# Patient Record
Sex: FEMALE | HISPANIC OR LATINO | Employment: OTHER | URBAN - METROPOLITAN AREA
[De-identification: names, ages, dates, MRNs, and addresses within clinical notes are randomized per-mention and may not be internally consistent; named-entity substitution may affect disease eponyms.]

---

## 2018-02-09 ENCOUNTER — OFFICE VISIT (OUTPATIENT)
Dept: RHEUMATOLOGY | Facility: CLINIC | Age: 68
End: 2018-02-09
Payer: MEDICARE

## 2018-02-09 ENCOUNTER — HOSPITAL ENCOUNTER (OUTPATIENT)
Dept: RADIOLOGY | Facility: HOSPITAL | Age: 68
Discharge: HOME | End: 2018-02-09
Attending: INTERNAL MEDICINE
Payer: MEDICARE

## 2018-02-09 VITALS
WEIGHT: 197 LBS | DIASTOLIC BLOOD PRESSURE: 79 MMHG | SYSTOLIC BLOOD PRESSURE: 142 MMHG | HEART RATE: 87 BPM | RESPIRATION RATE: 18 BRPM | BODY MASS INDEX: 38.68 KG/M2 | HEIGHT: 60 IN

## 2018-02-09 DIAGNOSIS — M25.50 POLYARTHRALGIA: ICD-10-CM

## 2018-02-09 DIAGNOSIS — M25.50 POLYARTHRALGIA: Primary | ICD-10-CM

## 2018-02-09 DIAGNOSIS — M15.9 PRIMARY OSTEOARTHRITIS INVOLVING MULTIPLE JOINTS: ICD-10-CM

## 2018-02-09 PROCEDURE — 99205 OFFICE O/P NEW HI 60 MIN: CPT | Mod: PBBFAC,25 | Performed by: INTERNAL MEDICINE

## 2018-02-09 PROCEDURE — 99204 OFFICE O/P NEW MOD 45 MIN: CPT | Mod: S$PBB,,, | Performed by: INTERNAL MEDICINE

## 2018-02-09 PROCEDURE — 1125F AMNT PAIN NOTED PAIN PRSNT: CPT | Mod: ,,, | Performed by: INTERNAL MEDICINE

## 2018-02-09 PROCEDURE — 77077 JOINT SURVEY SINGLE VIEW: CPT | Mod: 26,,, | Performed by: RADIOLOGY

## 2018-02-09 PROCEDURE — 99999 PR PBB SHADOW E&M-NEW PATIENT-LVL V: CPT | Mod: PBBFAC,,, | Performed by: INTERNAL MEDICINE

## 2018-02-09 PROCEDURE — 1159F MED LIST DOCD IN RCRD: CPT | Mod: ,,, | Performed by: INTERNAL MEDICINE

## 2018-02-09 PROCEDURE — 77077 JOINT SURVEY SINGLE VIEW: CPT | Mod: TC

## 2018-02-09 RX ORDER — METHIMAZOLE 5 MG/1
TABLET ORAL
COMMUNITY
Start: 2018-01-26

## 2018-02-09 RX ORDER — LISINOPRIL 5 MG/1
TABLET ORAL
COMMUNITY
Start: 2018-02-08

## 2018-02-09 RX ORDER — FLUTICASONE PROPIONATE AND SALMETEROL 50; 500 UG/1; UG/1
POWDER RESPIRATORY (INHALATION)
COMMUNITY
Start: 2018-02-08

## 2018-02-09 RX ORDER — WARFARIN 2.5 MG/1
TABLET ORAL
COMMUNITY
Start: 2018-01-29

## 2018-02-09 RX ORDER — BRIMONIDINE TARTRATE 1 MG/ML
SOLUTION/ DROPS OPHTHALMIC
COMMUNITY
Start: 2018-01-09

## 2018-02-09 RX ORDER — TRAMADOL HYDROCHLORIDE 50 MG/1
50 TABLET ORAL EVERY 8 HOURS PRN
Qty: 90 TABLET | Refills: 5 | Status: SHIPPED | OUTPATIENT
Start: 2018-02-09 | End: 2018-03-11

## 2018-02-09 RX ORDER — POTASSIUM CITRATE 10 MEQ/1
TABLET, EXTENDED RELEASE ORAL
COMMUNITY
Start: 2018-02-08

## 2018-02-09 RX ORDER — MONTELUKAST SODIUM 10 MG/1
TABLET ORAL
COMMUNITY
Start: 2018-02-08

## 2018-02-09 RX ORDER — LEVALBUTEROL TARTRATE 45 UG/1
AEROSOL, METERED ORAL
Refills: 2 | COMMUNITY
Start: 2018-01-04

## 2018-02-09 RX ORDER — BACITRACIN 500 [USP'U]/G
OINTMENT OPHTHALMIC
COMMUNITY
Start: 2017-12-08

## 2018-02-09 RX ORDER — OXYCODONE AND ACETAMINOPHEN 10; 325 MG/1; MG/1
TABLET ORAL
COMMUNITY
Start: 2017-11-29

## 2018-02-09 RX ORDER — DORZOLAMIDE HCL 20 MG/ML
SOLUTION/ DROPS OPHTHALMIC
COMMUNITY
Start: 2018-01-02

## 2018-02-09 RX ORDER — FLUTICASONE PROPIONATE 50 MCG
SPRAY, SUSPENSION (ML) NASAL
COMMUNITY
Start: 2018-02-08

## 2018-02-09 RX ORDER — ENOXAPARIN SODIUM 100 MG/ML
INJECTION SUBCUTANEOUS
COMMUNITY
Start: 2017-12-01

## 2018-02-09 RX ORDER — ONDANSETRON 4 MG/1
TABLET, ORALLY DISINTEGRATING ORAL
COMMUNITY
Start: 2017-11-29

## 2018-02-09 RX ORDER — LEVALBUTEROL INHALATION SOLUTION 0.63 MG/3ML
SOLUTION RESPIRATORY (INHALATION)
COMMUNITY
Start: 2018-01-09

## 2018-02-09 NOTE — PROGRESS NOTES
Subjective:       Patient ID: Cassie Srivastava is a 68 y.o. female.    Chief Complaint: No chief complaint on file.    HPI    67 yo F with PMH of Grave's disease (on methimazole) diagnosed in 2015,  Afib on coumadin, asthma, right knee replacement, pulmonary HTN, CHF, HTN, MELITON, left ankle fusion ( 2014), left rotator cuff tear since about age 63, hep C s/p treatment with interferon in 2006 here  for evaluation.  Reports she was recently diagnosed with pulmonary HTN in 2015 due to untreated MELITON.  Reports that she has pain in neck, low back, wrists, hands, left knee, and left ankle. Reports she has had pain for about 5 years.   Pain level is about 7/10 and aching. She takes tylenol with no significant improvement.  Reports swelling in wrists and ankles.  Walking makes pain worse.  Reports that she had pin placed in left  ankle for alignment but has had so much pain there it may be getting new surgery.  Reports she has stiffness in legs and arms in morning. She also has stiffness with prolonged sitting.  Reports on occasion itchy rash. Denies oral ulcers, hair loss, fevers, or pleurisy, Denies photosensitivity.        No past medical history on file.    Review of Systems   Constitutional: Negative for activity change, appetite change, chills, diaphoresis, fatigue, fever and unexpected weight change.   HENT: Negative for congestion, ear discharge, ear pain, facial swelling, mouth sores, sinus pressure, sneezing, sore throat, tinnitus and trouble swallowing.    Eyes: Negative for photophobia, pain, discharge, redness, itching and visual disturbance.   Respiratory: Negative for apnea, chest tightness, shortness of breath, wheezing and stridor.    Cardiovascular: Negative for leg swelling.   Gastrointestinal: Negative for abdominal distention, abdominal pain, anal bleeding, blood in stool, constipation, diarrhea and nausea.   Endocrine: Negative for cold intolerance and heat intolerance.   Genitourinary: Negative for  difficulty urinating and dysuria.   Musculoskeletal: Positive for arthralgias, back pain, gait problem, joint swelling and myalgias. Negative for neck pain and neck stiffness.   Skin: Negative for color change, pallor, rash and wound.   Neurological: Negative for dizziness, seizures, light-headedness and numbness.   Hematological: Negative for adenopathy. Does not bruise/bleed easily.   Psychiatric/Behavioral: Negative for sleep disturbance. The patient is not nervous/anxious.                Objective:   There were no vitals taken for this visit.     Physical Exam   Constitutional: She is oriented to person, place, and time.   HENT:   Head: Normocephalic and atraumatic.   Right Ear: External ear normal.   Left Ear: External ear normal.   Nose: Nose normal.   Mouth/Throat: Oropharynx is clear and moist. No oropharyngeal exudate.   Eyes: Conjunctivae and EOM are normal. Pupils are equal, round, and reactive to light. Right eye exhibits no discharge. Left eye exhibits no discharge. No scleral icterus.   Neck: Neck supple. No JVD present. No thyromegaly present.   Cardiovascular: Normal rate, regular rhythm, normal heart sounds and intact distal pulses.  Exam reveals no gallop and no friction rub.    No murmur heard.  Pulmonary/Chest: Effort normal and breath sounds normal. No respiratory distress. She has no wheezes. She has no rales. She exhibits no tenderness.   Abdominal: Soft. Bowel sounds are normal. She exhibits no distension and no mass. There is no tenderness. There is no rebound and no guarding.   Lymphadenopathy:     She has no cervical adenopathy.   Neurological: She is alert and oriented to person, place, and time. No cranial nerve deficit. Gait normal. Coordination normal.   Skin: Skin is dry. No rash noted. No erythema. No pallor.     Psychiatric: Affect and judgment normal.   Musculoskeletal: She exhibits edema and tenderness. She exhibits no deformity.                Assessment:     69 yo F with PMH of  Grave's disease (on methimazole) diagnosed in 2015,  Afib on coumadin, asthma, right knee replacement, pulmonary HTN, CHF, HTN, MELITON, left ankle fusion ( 2014), left rotator cuff tear since about age 63, hep C s/p treatment with interferon in 2006 here  for evaluation.  Reports she was recently diagnosed with pulmonary HTN in 2015 due to untreated MELITON.  Patient with very complicated medical history. I will work her up for systemic connective tissue disease to work her up further for her pulmonary HTN.  Regards her joint pain, on exam suspect, she has just OA but given her multiple surgeries will work her up for inflammatory arthritis as well.  No diagnosis found.        Plan:       Labs  xrays  rtc in 2 weeks  Encourage weight loss  *

## 2018-02-15 ENCOUNTER — TELEPHONE (OUTPATIENT)
Dept: RHEUMATOLOGY | Facility: CLINIC | Age: 68
End: 2018-02-15

## 2019-02-18 ENCOUNTER — TELEPHONE (OUTPATIENT)
Dept: OPHTHALMOLOGY | Facility: CLINIC | Age: 69
End: 2019-02-18

## 2019-02-18 NOTE — TELEPHONE ENCOUNTER
02/18/19  Katalina returned pt call and pt has been notified that she will be seen by Dr. Todd, OD of prism. stj 8:49a      ----- Message from Darvin Vizcarra sent at 2/15/2019  2:41 PM CST -----  Contact: Cassie  Ms. Srivastava says that her doctor advised her to schedule an appointment with Dr. Narvaez for strabismus. She can be reached at [phone].

## 2019-03-28 ENCOUNTER — INITIAL CONSULT (OUTPATIENT)
Dept: OPTOMETRY | Facility: CLINIC | Age: 69
End: 2019-03-28
Payer: MEDICARE

## 2019-03-28 DIAGNOSIS — H05.20 OCULAR PROPTOSIS: ICD-10-CM

## 2019-03-28 DIAGNOSIS — E05.00 GRAVES DISEASE: Primary | ICD-10-CM

## 2019-03-28 DIAGNOSIS — H50.112 EXOTROPIA OF LEFT EYE: ICD-10-CM

## 2019-03-28 PROCEDURE — 99499 NO LOS: ICD-10-PCS | Mod: S$PBB,,, | Performed by: OPTOMETRIST

## 2019-03-28 PROCEDURE — 99499 UNLISTED E&M SERVICE: CPT | Mod: S$PBB,,, | Performed by: OPTOMETRIST

## 2019-03-28 PROCEDURE — 99999 PR PBB SHADOW E&M-EST. PATIENT-LVL II: ICD-10-PCS | Mod: PBBFAC,,, | Performed by: OPTOMETRIST

## 2019-03-28 PROCEDURE — 99212 OFFICE O/P EST SF 10 MIN: CPT | Mod: PBBFAC | Performed by: OPTOMETRIST

## 2019-03-28 PROCEDURE — 99999 PR PBB SHADOW E&M-EST. PATIENT-LVL II: CPT | Mod: PBBFAC,,, | Performed by: OPTOMETRIST

## 2019-03-29 NOTE — PROGRESS NOTES
HPI     Concerns About Ocular Health      Additional comments: Referred for prism evaluation.  S/p orbital decompression surgery in 2018 (second procedure).  S/P strabismus surgery several years ago on left eye.  States procedure not successful.  Notes diplopia sometimes               Comments     Patient's age: 69 y.o.  female   Occupation: retired (nurse)  Approximate date of last eye examination: 01/2019  Name of last eye doctor seen: unknown  City/State: [facility]  Wears glasses? yes     If yes, wears  Full-time or part-time?  Full-time  Present glasses are: Bifocal, SV Distance, SV Reading?  bifocal  Approximate age of present glasses:  2 years    Got new glasses following last exam, or subsequently?:  no   Any problem with [facility] with glasses?  no  Wears CLs?:  no  Headaches?  Yes sinus  Eye pain/discomfort?  Dry eyes , itchy , watery eyes                                                                                   Flashes?  no  Floaters?  no  Diplopia/Double vision?  Yes left eye  Patient's Ocular History:         Any eye surgeries? Deep compression right 2018         Any eye injury?  no         Any treatment for eye disease?  glaucoma   Family history of eye disease?  Mother , sister and brother - glaucoma  Significant patient medical history:         1. Diabetes?  no   2. HBP?  Yes controlled with medication              3. Other (describe):  Grave disease    ! OTC eyedrops currently using:  Artifical tears , systane 3-4 daily    ! Prescription eye meds currently using:  bacitacin ad dorzolamide    ! Any history of allergy/adverse reaction to any eye meds used   previously?  no    ! Any history of allergy/adverse reaction to eyedrops used during prior   eye exam(s)? no    ! Any history of allergy/adverse reaction to Novacaine or similar meds?   no   ! Any history of allergy/adverse reaction to Epinephrine or similar meds?   no    ! Patient okay with use of anesthetic eyedrops to check eye pressure?     yes        ! Patient okay with use of eyedrops to dilate pupils today?  yes   !  Allergies/Medications/Medical History/Family History reviewed today?    yes      PD =     Desired reading distance =    Approx computer distance =                                                                     Last edited by Margarito Arzola, OD on 3/28/2019 11:20 AM. (History)            Assessment /Plan     For exam results, see Encounter Report.    Graves disease    Ocular proptosis    Exotropia of left eye                    History of Grave's Disease.  S/p orbital decompression surgery on right side.  S/p muscle surgery on left side, with residual large angle exotropia.  Report of transient diplopia.  Advised Ms. Srivastava that angle of deviation is greater than that which is corrected with prismatic correction,  Advised her to consider muscle surgery, and generate referral to Dr. Narvaez for consult,  If surgery done, suggest return thereafter for prismatic correction for residual deviation, if any .

## 2019-03-29 NOTE — PATIENT INSTRUCTIONS
History of Grave's Disease.  S/p orbital decompression surgery on right side.  S/p muscle surgery on left side, with residual large angle exotropia.  Report of transient diplopia.  Advised Ms. Srivastava that angle of deviation is greater than that which is corrected with prismatic correction,  Advised her to consider muscle surgery, and generate referral to Dr. Narvaez for consult,  If surgery done, suggest return thereafter for prismatic correction for residual deviation, if any .

## 2019-04-02 ENCOUNTER — TELEPHONE (OUTPATIENT)
Dept: OPHTHALMOLOGY | Facility: CLINIC | Age: 69
End: 2019-04-02

## 2019-04-02 NOTE — TELEPHONE ENCOUNTER
called patient infromed her that  referred her to see  but he only do muscle sx with patient under 50 infromed her she can do a consult with  at [facility] she stated ok she will contact him patient understand

## 2024-10-30 ENCOUNTER — TELEPHONE (OUTPATIENT)
Dept: INTERNAL MEDICINE | Facility: CLINIC | Age: 74
End: 2024-10-30
Payer: MEDICARE

## 2024-11-21 ENCOUNTER — OFFICE VISIT (OUTPATIENT)
Dept: INTERNAL MEDICINE | Facility: CLINIC | Age: 74
End: 2024-11-21
Payer: MEDICARE

## 2024-11-21 VITALS
BODY MASS INDEX: 38.54 KG/M2 | TEMPERATURE: 97 F | OXYGEN SATURATION: 99 % | WEIGHT: 204.13 LBS | HEIGHT: 61 IN | SYSTOLIC BLOOD PRESSURE: 136 MMHG | DIASTOLIC BLOOD PRESSURE: 74 MMHG | HEART RATE: 75 BPM

## 2024-11-21 DIAGNOSIS — Z78.0 ASYMPTOMATIC MENOPAUSAL STATE: ICD-10-CM

## 2024-11-21 DIAGNOSIS — I10 ESSENTIAL HYPERTENSION: ICD-10-CM

## 2024-11-21 DIAGNOSIS — G47.30 SLEEP APNEA WITH USE OF CONTINUOUS POSITIVE AIRWAY PRESSURE (CPAP): ICD-10-CM

## 2024-11-21 DIAGNOSIS — J43.1 PANLOBULAR EMPHYSEMA: ICD-10-CM

## 2024-11-21 DIAGNOSIS — H40.9 GLAUCOMA, UNSPECIFIED GLAUCOMA TYPE, UNSPECIFIED LATERALITY: ICD-10-CM

## 2024-11-21 DIAGNOSIS — E89.0 POST-SURGICAL HYPOTHYROIDISM: ICD-10-CM

## 2024-11-21 DIAGNOSIS — I48.20 CHRONIC ATRIAL FIBRILLATION: Primary | ICD-10-CM

## 2024-11-21 DIAGNOSIS — I27.20 PULMONARY HYPERTENSION: ICD-10-CM

## 2024-11-21 PROBLEM — E06.3 HASHIMOTO'S DISEASE: Status: ACTIVE | Noted: 2019-01-09

## 2024-11-21 PROBLEM — J44.1 COPD EXACERBATION: Status: ACTIVE | Noted: 2023-06-06

## 2024-11-21 PROCEDURE — 99204 OFFICE O/P NEW MOD 45 MIN: CPT | Mod: S$PBB,,, | Performed by: INTERNAL MEDICINE

## 2024-11-21 PROCEDURE — 99999 PR PBB SHADOW E&M-EST. PATIENT-LVL V: CPT | Mod: PBBFAC,,, | Performed by: INTERNAL MEDICINE

## 2024-11-21 PROCEDURE — G2211 COMPLEX E/M VISIT ADD ON: HCPCS | Mod: S$PBB,,, | Performed by: INTERNAL MEDICINE

## 2024-11-21 PROCEDURE — 99215 OFFICE O/P EST HI 40 MIN: CPT | Mod: PBBFAC,PO | Performed by: INTERNAL MEDICINE

## 2024-11-21 RX ORDER — DOFETILIDE 0.5 MG/1
125 CAPSULE ORAL EVERY 12 HOURS
COMMUNITY

## 2024-11-21 RX ORDER — ACETAMINOPHEN 325 MG/1
500 TABLET ORAL
COMMUNITY

## 2024-11-21 RX ORDER — CETIRIZINE HYDROCHLORIDE 10 MG/1
10 TABLET ORAL
COMMUNITY

## 2024-11-21 RX ORDER — ACETAMINOPHEN 500 MG
TABLET ORAL DAILY
COMMUNITY

## 2024-11-21 RX ORDER — LEVOTHYROXINE SODIUM 100 UG/1
100 TABLET ORAL
COMMUNITY
End: 2024-11-21 | Stop reason: ALTCHOICE

## 2024-11-21 RX ORDER — OMEPRAZOLE 10 MG/1
10 CAPSULE, DELAYED RELEASE ORAL
COMMUNITY

## 2024-11-21 RX ORDER — LEVOTHYROXINE SODIUM 88 UG/1
88 TABLET ORAL
COMMUNITY
Start: 2024-11-21

## 2024-11-21 RX ORDER — BIMATOPROST 0.1 MG/ML
1 SOLUTION/ DROPS OPHTHALMIC NIGHTLY
Qty: 7.5 ML | Refills: 1 | Status: SHIPPED | OUTPATIENT
Start: 2024-11-21

## 2024-11-21 RX ORDER — ATORVASTATIN CALCIUM 10 MG/1
10 TABLET, FILM COATED ORAL DAILY
COMMUNITY

## 2024-11-21 RX ORDER — FUROSEMIDE 20 MG/1
20 TABLET ORAL 2 TIMES DAILY
COMMUNITY

## 2024-11-21 RX ORDER — TEZEPELUMAB-EKKO 210 MG/1.9ML
INJECTION, SOLUTION SUBCUTANEOUS
COMMUNITY

## 2024-11-21 NOTE — PROGRESS NOTES
Subjective:       Patient ID: Cassie Srivastava is a 74 y.o. female.    Chief Complaint: Establish Care      HPI:  History of Present Illness    Patient presents today for follow-up and establishment of care after moving from [place].    She was diagnosed with atrial fibrillation approximately 10 years ago, managed with a anticoagulant (Xarelto 20 mg daily) and flecainide. She denies any major problems related to her atrial fibrillation.    She has a history of COPD, asthma, and pulmonary hypertension. She receives monthly injections for asthma control, which she reports are significantly helpful. She denies any major problems with her pulmonary conditions. She uses a CPAP machine for sleep apnea management, which has resolved previous episodes of waking up with air hunger.    She has a history of thyroidectomy due to three nodules and is currently taking Synthroid as thyroid replacement therapy.    She reports a history of depression, but is not currently taking any medication for it. She experiences occasional anxiety but denies any current depressive symptoms requiring treatment.    She reports an allergy to codeine, which causes nausea.    She has a history of occasional social smoking in nightclub settings and significant second-hand smoke exposure as a child. She acknowledges this exposure may have contributed to her asthma. She reports occasional alcohol use.    Her surgical history includes a partial hysterectomy, right knee replacement, cataract surgery, and bunion surgery.    She reports a family history of diabetes and hypertension in her sister, and hypertension and stroke in her brother.    She had a recent mammogram in [place], a colonoscopy in 2022 where polyps were removed, a pneumonia vaccination approximately 5-6 months ago, and a flu vaccine in September.    She takes Xarelto 20 mg daily and uses Lumigan 1% eye drops at night for glaucoma management.    She has glaucoma and states the need  "to see an eye doctor for management of this condition.         Review of Systems   Constitutional:  Negative for chills and fever.   HENT:  Negative for hearing loss.    Eyes:  Negative for photophobia and visual disturbance.   Respiratory:  Negative for cough, shortness of breath and wheezing.    Cardiovascular:  Negative for chest pain and palpitations.   Gastrointestinal:  Negative for blood in stool, constipation, nausea and vomiting.   Genitourinary:  Negative for dysuria and hematuria.   Musculoskeletal:  Negative for neck pain and neck stiffness.   Skin:  Negative for rash.   Neurological:  Negative for syncope, weakness, light-headedness and headaches.   Hematological:  Negative for adenopathy.   Psychiatric/Behavioral:  Negative for dysphoric mood. The patient is not nervous/anxious.        Objective:   /74 (BP Location: Left arm, Patient Position: Sitting)   Pulse 75   Temp 97.4 °F (36.3 °C) (Tympanic)   Ht 5' 1" (1.549 m)   Wt 92.6 kg (204 lb 2.3 oz)   SpO2 99%   BMI 38.57 kg/m²      Physical Exam  Vitals reviewed.   Constitutional:       Appearance: Normal appearance. She is well-developed. She is not ill-appearing.   HENT:      Head: Normocephalic and atraumatic.      Right Ear: Tympanic membrane, ear canal and external ear normal.      Left Ear: Tympanic membrane, ear canal and external ear normal.   Eyes:      Pupils: Pupils are equal, round, and reactive to light.   Neck:      Thyroid: No thyromegaly.   Cardiovascular:      Rate and Rhythm: Normal rate and regular rhythm.      Heart sounds: No murmur heard.     No friction rub. No gallop.   Pulmonary:      Effort: Pulmonary effort is normal.      Breath sounds: Normal breath sounds. No wheezing or rales.   Chest:      Chest wall: No tenderness.   Abdominal:      General: Abdomen is flat. Bowel sounds are normal. There is no distension.      Palpations: Abdomen is soft. There is no mass.      Tenderness: There is no abdominal tenderness. " There is no guarding or rebound.   Musculoskeletal:      Cervical back: Normal range of motion and neck supple.   Lymphadenopathy:      Cervical: No cervical adenopathy.   Skin:     General: Skin is warm and dry.      Findings: No rash.   Neurological:      General: No focal deficit present.      Mental Status: She is alert and oriented to person, place, and time.      Cranial Nerves: No cranial nerve deficit.      Deep Tendon Reflexes: Reflexes are normal and symmetric. Reflexes normal.   Psychiatric:         Behavior: Behavior normal.         Judgment: Judgment normal.         Lab work performed on November 8, 2024 reveals following:     White blood cell count 5.2, hemoglobin 13.1, hematocrit 39.2, platelets 191.  Sodium 141, potassium 4.7, chloride 104, bicarb 27, BUN 24, creatinine 0.879, glucose 84.  Calcium 9.3.  Protein 7.3.  Albumin 4.1.  Bilirubin 0.7.  Alk-phos 98.  ALT 15.  AST 21.    Iron 128, TIBC 335, saturation 38%,.    Cholesterol 171, triglycerides 56, HDL 71, LDL 89.  Hemoglobin A1c 5.3 TSH 7.27 with a free T4 of 1.16.  Vitamin B12 1151.  Vitamin-D 56.    Assessment:       1. Chronic atrial fibrillation    2. Panlobular emphysema    3. Sleep apnea with use of continuous positive airway pressure (CPAP)    4. Essential hypertension    5. Pulmonary hypertension    6. Post-surgical hypothyroidism    7. Asymptomatic menopausal state    8. Glaucoma, unspecified glaucoma type, unspecified laterality        Plan:   Sleep apnea with use of continuous positive airway pressure (CPAP)  Patient is compliant with use of cpap, using it the majority of nights.  Benefit from the use of the device is noted.    Cassie was seen today for establish care.    Diagnoses and all orders for this visit:    Chronic atrial fibrillation  Comments:  rate controlled, on eliquis.  refer to cardiology  Orders:  -     Ambulatory referral/consult to Cardiology; Future  -     rivaroxaban (XARELTO) 20 mg Tab; Take 1 tablet (20 mg  total) by mouth daily with dinner or evening meal.    Panlobular emphysema  Comments:  stable, refer to pulmonary for establishing care  Orders:  -     Ambulatory referral/consult to Pulmonology; Future    Sleep apnea with use of continuous positive airway pressure (CPAP)  Comments:  Stable on cpap, Continue and follow up with pulmomary/sleep    Essential hypertension  Comments:  No change, stable.    Pulmonary hypertension  Comments:  Continue meds as prescribed. work on weight loss, follow up with pulmonary/cards    Post-surgical hypothyroidism  -     TSH; Future    Asymptomatic menopausal state  -     DXA Bone Density Axial Skeleton 1 or more sites; Future    Glaucoma, unspecified glaucoma type, unspecified laterality  -     bimatoprost (LUMIGAN) 0.01 % Drop; Place 1 drop into both eyes every evening.  -     Ambulatory referral/consult to Ophthalmology; Future      Assessment & Plan    ATRIAL FIBRILLATION:  - Reviewed patient's history of atrial fibrillation.  - Evaluated current medication regimen, including anticoagulation therapy.  - Continued Xarelto 20 mg daily.  - Referred to Cardiology.    CHRONIC OBSTRUCTIVE PULMONARY DISEASE (COPD):  - Reviewed patient's history of COPD.  - Referred to Pulmonology.    HYPERTENSION:  - Reviewed patient's history of hypertension.  - Assessed cardiovascular risk factors, including family history of hypertension and stroke.    SLEEP APNEA:  - Reviewed patient's history of sleep apnea.    THYROID DISORDERS:  - Reviewed patient's history of thyroidectomy.  - Noted recent thyroid medication adjustment from 75 to 88 mcg.  - Thyroid function test ordered in 3 months to reassess after medication adjustment.    MENTAL HEALTH:  - Considered patient's history of depression and current anxiety symptoms.    GLAUCOMA:  - Started Lumigan 0.1% eye drops, 1 drop in both eyes at night.  - Referred to Ophthalmology.    GENERAL HEALTH MANAGEMENT:  - Determined need for ongoing specialist  care, including cardiology, pulmonology, and ophthalmology.  - Bone density scan ordered to be completed within the next 1-2 months.    FOLLOW-UP:  - Follow up after the holidays.         Follow up in about 8 weeks (around 1/16/2025).

## 2024-12-02 ENCOUNTER — TELEPHONE (OUTPATIENT)
Dept: PULMONOLOGY | Facility: CLINIC | Age: 74
End: 2024-12-02
Payer: MEDICARE

## 2024-12-02 NOTE — TELEPHONE ENCOUNTER
----- Message from Sharon sent at 12/2/2024  1:17 PM CST -----  Contact: self  [phone]  Type:  Needs Medical Advice    Who Called: Cassie  Symptoms (please be specific):  How long has patient had these symptoms:   Pharmacy name and phone #:   Would the patient rather a call back or a response via [facility]?call back   Best Call Back Number:  [phone]  Additional Information: patient called in this afternoon to give the fax number so you can fax over information on what records are needed.  Fax number for Dr. Kojo Farmer  [phone] Please call back  [phone]. Thanks tpw or ask to speak with Jenna Feliciano  [phone]

## 2024-12-02 NOTE — TELEPHONE ENCOUNTER
Called pt to notify we'll need a signed release of information form prior to requesting records from previous Dr's office, no answer, not able to leave message

## 2024-12-04 DIAGNOSIS — I10 ESSENTIAL HYPERTENSION: ICD-10-CM

## 2024-12-09 ENCOUNTER — TELEPHONE (OUTPATIENT)
Dept: PULMONOLOGY | Facility: CLINIC | Age: 74
End: 2024-12-09
Payer: MEDICARE

## 2024-12-09 NOTE — TELEPHONE ENCOUNTER
Attempted to return call to verify DAVID to get history records, incorrect phone number listed.. Unable to reach patient at this time LVM/.

## 2024-12-09 NOTE — TELEPHONE ENCOUNTER
----- Message from Lylette sent at 12/6/2024  4:56 PM CST -----  Regarding: Pt advice  Contact: [phone]  Pt calling to confirm that record release form was sent to Dr. Robins. Pt appt on 12/12. Please call [phone], fax [phone] Dr. Jenna Robins

## 2024-12-10 ENCOUNTER — TELEPHONE (OUTPATIENT)
Dept: PULMONOLOGY | Facility: CLINIC | Age: 74
End: 2024-12-10
Payer: MEDICARE

## 2024-12-10 NOTE — TELEPHONE ENCOUNTER
Good afternoon, we cannot just fax a request for medical records you must come in and sign a release of information before I can do that or you can call them and have them fax the records to Dr. Cruz at [phone]

## 2024-12-10 NOTE — TELEPHONE ENCOUNTER
----- Message from Sharon sent at 12/10/2024  3:26 PM CST -----  Contact: self  [phone]  Type:  Needs Medical Advice    Who Called: Cassie  Symptoms (please be specific): records   How long has patient had these symptoms:   Pharmacy name and phone #:   Would the patient rather a call back or a response via [facility]?call back   Best Call Back Number:  [phone]  Additional Information: patient called in this afternoon leaving a fax number to request her records from her previous provider in [place]. Dr. Jenna Feliciano NP   [phone]  fax [phone]

## 2024-12-11 ENCOUNTER — TELEPHONE (OUTPATIENT)
Dept: PULMONOLOGY | Facility: CLINIC | Age: 74
End: 2024-12-11
Payer: MEDICARE

## 2024-12-11 NOTE — TELEPHONE ENCOUNTER
Spoke with patient to explain the HIPAA policy requarding release of information. Pt voiced understanding.

## 2024-12-11 NOTE — PROGRESS NOTES
"Chief complaint:  Chief Complaint   Patient presents with    Emphysema        History of present illness -  HPI   Ms Srivastava comes to clinic to establish care.  She carries a history significant for severe persistent asthma on biologic therapy (Tezepelumab), atrial fibrillation and heart failure with preserved ejection fraction.  She was exposed to viral illness earlier this month that resulted in a asthma exacerbation, she presented to an urgent care on December 4th where she was found to have severely elevated blood pressure and was denied care, she was told to go to the emergency department were she was treated with p.o. steroids as well as antibiotics, and sent home, because her symptoms were not improving enough she decided to go back to the ER the next day where she was admitted for a asthma as well as a CHF exacerbation, she was started on frequent nebulization therapy, IV steroids as well as antibiotics for potential bacterial infection.  She was discharged 3 days later after her symptoms improved a bit.  Per chart review she tested negative for COVID, RSV and influenza, but her grandson who lives with her was also sick.  Her main lingering symptom is cough and persistent wheezing.    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    CMP was essentially " unremarkable    Spirometry -  Not available    Imaging -   A report from an x-ray performed at [facility] on 2024 showed mild cardiac silhouette enlargement.    Assessment & Plan    Chronic atrial fibrillation    Panlobular emphysema  Comments:  stable, refer to pulmonary for establishing care  Orders:  -     Ambulatory referral/consult to Pulmonology  -     IgE; Future; Expected date: 2024  -     CBC Auto Differential; Future; Expected date: 2024  -     Fraction of  Nitric Oxide; Future; Expected date: 2024  -     Ambulatory referral/consult to Pulmonary Disease Management w/ Respiratory Therapist; Future; Expected date: 2024  -     Complete PFT with bronchodilator; Future; Expected date: 2024    Moderate persistent asthma with acute exacerbation  -     IgE; Future; Expected date: 2024    MELITON (obstructive sleep apnea)    Pulmonary hypertension    Other orders  -     predniSONE (DELTASONE) 20 MG tablet; Take 2 tablets (40 mg total) by mouth once daily. for 12 days  Dispense: 24 tablet; Refill: 0  -     tezepelumab-ekko 210 mg/1.91 mL (110 mg/mL) Syrg; Inject 1.91 mLs (210 mg total) into the skin every 30 days.  Dispense: 1 mL; Refill: 5    She has a classic asthma exacerbation from a viral illness, we will prescribe the patient 12 more days of 40 mg of prednisone as her symptoms are lingering, she is to continue her rescue inhaler and maintenance inhaler as prescribed.  She is currently taking tezepelumab for her difficult to control asthma, her next dose is on , we will arrange for this to happen in our system.    Rest of the workup to be done during next visit with complete PFT, IgE, CBC, allergy testing in the future.    Continue same fluticasone spray for allergic rhinitis.    She has a history of MELITON of uncertain severity, when we get access to her outside records we will migrate her care to us.    RTC in 4 weeks    Shon Cruz    12/12/2024

## 2024-12-11 NOTE — TELEPHONE ENCOUNTER
----- Message from Sharon sent at 12/11/2024  1:36 PM CST -----  Contact: self  [phone]  Type:  Needs Medical Advice    Who Called: Cassie  Symptoms (please be specific):  How long has patient had these symptoms:   Pharmacy name and phone #:    Would the patient rather a call back or a response via [facility]?call back   Best Call Back Number:  [phone]  Additional Information: patient called in this afternoon wanting to know have you received the medical records from her previous provider. Please call back to verify she has a appointment scheduled for tomorrow. Thanks azael

## 2024-12-12 ENCOUNTER — APPOINTMENT (OUTPATIENT)
Dept: RADIOLOGY | Facility: HOSPITAL | Age: 74
End: 2024-12-12
Attending: INTERNAL MEDICINE
Payer: MEDICARE

## 2024-12-12 ENCOUNTER — OFFICE VISIT (OUTPATIENT)
Dept: PULMONOLOGY | Facility: CLINIC | Age: 74
End: 2024-12-12
Payer: MEDICARE

## 2024-12-12 VITALS
SYSTOLIC BLOOD PRESSURE: 138 MMHG | HEIGHT: 61 IN | HEART RATE: 61 BPM | BODY MASS INDEX: 37.88 KG/M2 | WEIGHT: 200.63 LBS | OXYGEN SATURATION: 98 % | RESPIRATION RATE: 13 BRPM | DIASTOLIC BLOOD PRESSURE: 70 MMHG

## 2024-12-12 DIAGNOSIS — J45.41 MODERATE PERSISTENT ASTHMA WITH ACUTE EXACERBATION: ICD-10-CM

## 2024-12-12 DIAGNOSIS — I48.20 CHRONIC ATRIAL FIBRILLATION: Primary | ICD-10-CM

## 2024-12-12 DIAGNOSIS — Z78.0 ASYMPTOMATIC MENOPAUSAL STATE: ICD-10-CM

## 2024-12-12 DIAGNOSIS — J43.1 PANLOBULAR EMPHYSEMA: ICD-10-CM

## 2024-12-12 DIAGNOSIS — G47.33 OSA (OBSTRUCTIVE SLEEP APNEA): ICD-10-CM

## 2024-12-12 DIAGNOSIS — J30.2 SEASONAL ALLERGIC RHINITIS, UNSPECIFIED TRIGGER: ICD-10-CM

## 2024-12-12 DIAGNOSIS — I27.20 PULMONARY HYPERTENSION: ICD-10-CM

## 2024-12-12 PROCEDURE — 99215 OFFICE O/P EST HI 40 MIN: CPT | Mod: PBBFAC,25 | Performed by: STUDENT IN AN ORGANIZED HEALTH CARE EDUCATION/TRAINING PROGRAM

## 2024-12-12 PROCEDURE — 77080 DXA BONE DENSITY AXIAL: CPT | Mod: 26,,, | Performed by: RADIOLOGY

## 2024-12-12 PROCEDURE — 77080 DXA BONE DENSITY AXIAL: CPT | Mod: TC

## 2024-12-12 PROCEDURE — 99999 PR PBB SHADOW E&M-EST. PATIENT-LVL V: CPT | Mod: PBBFAC,,, | Performed by: STUDENT IN AN ORGANIZED HEALTH CARE EDUCATION/TRAINING PROGRAM

## 2024-12-12 RX ORDER — MONTELUKAST SODIUM 10 MG/1
1 TABLET ORAL NIGHTLY
COMMUNITY

## 2024-12-12 RX ORDER — PREDNISONE 20 MG/1
40 TABLET ORAL DAILY
Qty: 24 TABLET | Refills: 0 | Status: SHIPPED | OUTPATIENT
Start: 2024-12-12 | End: 2024-12-24

## 2024-12-12 RX ORDER — DOCUSATE SODIUM 100 MG/1
100 CAPSULE, LIQUID FILLED ORAL
COMMUNITY
Start: 2024-12-09

## 2024-12-12 RX ORDER — AMLODIPINE BESYLATE 5 MG/1
5 TABLET ORAL
COMMUNITY
Start: 2024-02-01

## 2024-12-12 RX ORDER — LANOLIN ALCOHOL/MO/W.PET/CERES
1 CREAM (GRAM) TOPICAL EVERY MORNING
COMMUNITY

## 2024-12-12 RX ORDER — ZINC GLUCONATE 50 MG
1 TABLET ORAL EVERY MORNING
COMMUNITY

## 2024-12-12 RX ORDER — BENZONATATE 100 MG/1
200 CAPSULE ORAL
COMMUNITY
Start: 2024-12-09

## 2024-12-16 ENCOUNTER — TELEPHONE (OUTPATIENT)
Dept: INTERNAL MEDICINE | Facility: CLINIC | Age: 74
End: 2024-12-16
Payer: MEDICARE

## 2024-12-16 NOTE — TELEPHONE ENCOUNTER
----- Message from Lukasz sent at 12/16/2024  8:52 AM CST -----  Type:  Needs Medical Advice    Who Called: PT  Symptoms (please be specific): Symptom: High Blood Pressure - Caller Reports  Outcome: Schedule a same-day appointment or talk to a nurse or provider within 1 hour.  Reason: Caller denied all higher acuity questions  How long has patient had these symptoms:  5 days - since starting prednisone  Pharmacy name and phone #:  -  Would the patient rather a call back or a response via [facility]? CALL  Best Call Back Number:  [phone]  Additional Information: Pt states she would like a call back from office to see what she should do. Pt has an appt scheduled for tomorrow at 10. Thank you

## 2024-12-17 ENCOUNTER — OFFICE VISIT (OUTPATIENT)
Dept: INTERNAL MEDICINE | Facility: CLINIC | Age: 74
End: 2024-12-17
Payer: MEDICARE

## 2024-12-17 VITALS
BODY MASS INDEX: 38.49 KG/M2 | DIASTOLIC BLOOD PRESSURE: 66 MMHG | WEIGHT: 203.69 LBS | OXYGEN SATURATION: 97 % | TEMPERATURE: 98 F | SYSTOLIC BLOOD PRESSURE: 158 MMHG | HEART RATE: 62 BPM

## 2024-12-17 DIAGNOSIS — Z09 HOSPITAL DISCHARGE FOLLOW-UP: Primary | ICD-10-CM

## 2024-12-17 DIAGNOSIS — I50.9 CONGESTIVE HEART FAILURE, UNSPECIFIED HF CHRONICITY, UNSPECIFIED HEART FAILURE TYPE: ICD-10-CM

## 2024-12-17 DIAGNOSIS — I48.20 CHRONIC ATRIAL FIBRILLATION: ICD-10-CM

## 2024-12-17 DIAGNOSIS — J44.1 COPD WITH EXACERBATION: ICD-10-CM

## 2024-12-17 DIAGNOSIS — E66.01 SEVERE OBESITY (BMI 35.0-39.9) WITH COMORBIDITY: ICD-10-CM

## 2024-12-17 DIAGNOSIS — I10 ESSENTIAL HYPERTENSION: ICD-10-CM

## 2024-12-17 PROCEDURE — 99215 OFFICE O/P EST HI 40 MIN: CPT | Mod: PBBFAC,PO | Performed by: NURSE PRACTITIONER

## 2024-12-17 PROCEDURE — 99495 TRANSJ CARE MGMT MOD F2F 14D: CPT | Mod: S$PBB,,, | Performed by: NURSE PRACTITIONER

## 2024-12-17 PROCEDURE — 99999 PR PBB SHADOW E&M-EST. PATIENT-LVL V: CPT | Mod: PBBFAC,,, | Performed by: NURSE PRACTITIONER

## 2024-12-17 RX ORDER — LISINOPRIL 20 MG/1
20 TABLET ORAL 2 TIMES DAILY
COMMUNITY

## 2024-12-17 RX ORDER — AMLODIPINE BESYLATE 5 MG/1
5 TABLET ORAL DAILY
Qty: 90 TABLET | Refills: 0 | Status: SHIPPED | OUTPATIENT
Start: 2024-12-17 | End: 2025-03-17

## 2024-12-17 NOTE — PROGRESS NOTES
Subjective:       Patient ID: Cassie Srivastava is a 74 y.o. female.    CHIEF COMPLAINT:  - Ms. Srivastava presents for follow-up after a recent hospitalization for congestive heart failure (CHF) exacerbation, with concerns about persistent elevated blood pressure and breathing difficulties.    HPI:  Ms. Srivastava recently relocated to [place] from [place] in November and was hospitalized for CHF exacerbation. She has a history of atrial fibrillation (A-fib) and CHF. During hospitalization, she received Lasix and IV Solu-Medrol, followed by a 3-day course of oral prednisone. A pulmonologist consultation on the 12th recommended continuing steroids for a few additional days.    Post-hospitalization, the patient reports persistently elevated blood pressure, particularly in the mornings, with readings as high as 180/70, later decreasing to around 160. This marks a significant change from her previously well-controlled blood pressure, typically in the 120s to 130s. Ms. Srivastava is concerned about these elevated readings and their potential relationship to her medications or CHF. She does admit she was taking 2.5 mg of her norvasc     Ms. Srivastava had the flu concurrently with her recent health complications. She reports fatigue and occasional dizziness, which she attributes to her high blood pressure and breathing difficulties. Ms. Srivastava notes that her recovery is taking longer than anticipated.    Regarding respiratory symptoms, the patient is using an inhaler and nebulizer. She takes Advair in the mornings and evenings. She reports a significant cough that has since improved but still requires expectoration. Ms. Srivastava performs breathing exercises, including using an incentive spirometer 3 times daily, and engages in sitting exercises for physical activity.         BP (!) 158/66   Pulse 62   Temp 98.1 °F (36.7 °C) (Tympanic)   Wt 92.4 kg (203 lb 11.3 oz)   SpO2 97%   BMI 38.49 kg/m²     Review of  Systems   Constitutional:  Negative for activity change, appetite change, chills, diaphoresis, fatigue, fever and unexpected weight change.   HENT: Negative.     Respiratory:  Positive for cough and chest tightness. Negative for shortness of breath.    Cardiovascular:  Negative for chest pain, palpitations and leg swelling.   Gastrointestinal: Negative.    Genitourinary: Negative.    Musculoskeletal: Negative.    Skin:  Negative for color change, pallor, rash and wound.   Allergic/Immunologic: Negative for immunocompromised state.   Neurological: Negative.  Negative for dizziness and facial asymmetry.   Hematological:  Negative for adenopathy. Does not bruise/bleed easily.   Psychiatric/Behavioral:  Negative for agitation, behavioral problems and confusion.        Objective:      Physical Exam  Vitals and nursing note reviewed.   Constitutional:       General: She is not in acute distress.     Appearance: Normal appearance. She is well-developed. She is not diaphoretic.   HENT:      Head: Normocephalic and atraumatic.   Cardiovascular:      Rate and Rhythm: Normal rate and regular rhythm.      Heart sounds: Normal heart sounds. No murmur heard.  Pulmonary:      Effort: Pulmonary effort is normal. No respiratory distress.      Breath sounds: Wheezing present.   Musculoskeletal:         General: Normal range of motion.   Skin:     General: Skin is warm and dry.      Findings: No rash.   Neurological:      Mental Status: She is alert.   Psychiatric:         Mood and Affect: Mood normal.         Behavior: Behavior normal. Behavior is cooperative.         Thought Content: Thought content normal.         Judgment: Judgment normal.         Assessment and Plan        Cassie was seen today for hospital follow up.    Diagnoses and all orders for this visit:    Hospital discharge follow-up    Chronic atrial fibrillation    COPD with exacerbation    Essential hypertension  -     amLODIPine (NORVASC) 5 MG tablet; Take 1 tablet  (5 mg total) by mouth once daily.    Congestive heart failure, unspecified HF chronicity, unspecified heart failure type    Severe obesity (BMI 35.0-39.9) with comorbidity      There are no Patient Instructions on file for this visit.      1. Hospital discharge follow-up    2. Chronic atrial fibrillation    3. COPD with exacerbation    4. Essential hypertension    5. Congestive heart failure, unspecified HF chronicity, unspecified heart failure type    6. Severe obesity (BMI 35.0-39.9) with comorbidity        Assessment & Plan    HEART FAILURE AND ATRIAL FIBRILLATION:  - Continued management of concurrent cardiac (A-fib, CHF) issues following recent hospital discharge for CHF exacerbation complicated by flu.  - Continued current Lasix regimen to manage fluid status.  - Monitored potassium levels due to Lasix use.  - Awaiting cardiology follow-up for further management of cardiac issues.  - Discussed importance of fluid restriction and low sodium diet for CHF management.  - Recommend maintaining low sodium diet.    CHRONIC OBSTRUCTIVE PULMONARY DISEASE:  - Continued management of pulmonary (COPD) issues following recent hospital discharge.  - Educated on use of incentive spirometer and breathing exercises for lung function.  - Ms. Srivastava to perform deep breathing exercises, walk around house, and use incentive spirometer 3 times daily.  - Started OTC Mucinex (guaifenesin only, no DM) as needed for mucus.  - Continued Advair inhaler in morning and evening.    HYPERTENSION:  - Noted elevated blood pressure likely due to recent illness and medications.  - Increased Amlodipine to 5mg daily; trial for a few days to assess blood pressure response.  - Continued Lisinopril 20mg twice daily.  - Refilled Amlodipine 5mg.  - Contact the office if blood pressure remains elevated after trial of increased Amlodipine.    FOLLOW-UP:  - Follow up with Dr. Stafford (cardiology) on January 22nd as scheduled  - keep follow up with   Nina in [place] as scheduled and PRN         This note was generated with the assistance of ambient listening technology. Verbal consent was obtained by the patient and accompanying visitor(s) for the recording of patient appointment to facilitate this note. I attest to having reviewed and edited the generated note for accuracy, though some syntax or spelling errors may persist. Please contact the author of this note for any clarification.

## 2024-12-17 NOTE — PROGRESS NOTES
Subjective:       Patient ID: Cassie Srivastava is a 74 y.o. female.    Chief Complaint: Hospital Follow Up    HPI    BP (!) 158/66   Pulse 62   Temp 98.1 °F (36.7 °C) (Tympanic)   Wt 92.4 kg (203 lb 11.3 oz)   SpO2 97%   BMI 38.49 kg/m²     Review of Systems    Objective:      Physical Exam    Assessment:       1. Hospital discharge follow-up    2. Chronic atrial fibrillation    3. COPD with exacerbation    4. Essential hypertension    5. Congestive heart failure, unspecified HF chronicity, unspecified heart failure type    6. BMI 38.0-38.9,adult        Plan:       Cassie was seen today for hospital follow up.    Diagnoses and all orders for this visit:    Hospital discharge follow-up    Chronic atrial fibrillation    COPD with exacerbation    Essential hypertension  -     amLODIPine (NORVASC) 5 MG tablet; Take 1 tablet (5 mg total) by mouth once daily.    Congestive heart failure, unspecified HF chronicity, unspecified heart failure type    BMI 38.0-38.9,adult      There are no Patient Instructions on file for this visit.

## 2024-12-26 ENCOUNTER — TELEPHONE (OUTPATIENT)
Dept: CARDIOLOGY | Facility: CLINIC | Age: 74
End: 2024-12-26
Payer: MEDICARE

## 2024-12-26 NOTE — TELEPHONE ENCOUNTER
----- Message from Allison sent at 12/26/2024  1:16 PM CST -----  Contact: self  Patient is requesting a call back regarding appt - asking for a sooner appt. Please call back at [phone]

## 2024-12-26 NOTE — TELEPHONE ENCOUNTER
Patient contacted. Patient requests a sooner appointment with Dr. Carlos Stafford. Appointment has been rescheduled to 1/3/25. Patient has been made aware of all appointment information. Patient verbalizes understanding.

## 2025-01-02 DIAGNOSIS — I48.91 ATRIAL FIBRILLATION, UNSPECIFIED TYPE: Primary | ICD-10-CM

## 2025-01-09 ENCOUNTER — OFFICE VISIT (OUTPATIENT)
Dept: INTERNAL MEDICINE | Facility: CLINIC | Age: 75
End: 2025-01-09
Payer: MEDICARE

## 2025-01-09 ENCOUNTER — HOSPITAL ENCOUNTER (OUTPATIENT)
Dept: RADIOLOGY | Facility: HOSPITAL | Age: 75
Discharge: HOME | End: 2025-01-09
Attending: NURSE PRACTITIONER
Payer: MEDICARE

## 2025-01-09 VITALS
SYSTOLIC BLOOD PRESSURE: 150 MMHG | HEIGHT: 61 IN | DIASTOLIC BLOOD PRESSURE: 64 MMHG | BODY MASS INDEX: 37.88 KG/M2 | WEIGHT: 200.63 LBS | OXYGEN SATURATION: 98 % | HEART RATE: 80 BPM | TEMPERATURE: 99 F

## 2025-01-09 DIAGNOSIS — R10.32 LEFT LOWER QUADRANT PAIN: ICD-10-CM

## 2025-01-09 DIAGNOSIS — R10.32 LEFT LOWER QUADRANT PAIN: Primary | ICD-10-CM

## 2025-01-09 DIAGNOSIS — I10 ESSENTIAL HYPERTENSION: ICD-10-CM

## 2025-01-09 PROCEDURE — 99999 PR PBB SHADOW E&M-EST. PATIENT-LVL V: CPT | Mod: PBBFAC,,, | Performed by: NURSE PRACTITIONER

## 2025-01-09 PROCEDURE — 99215 OFFICE O/P EST HI 40 MIN: CPT | Mod: PBBFAC,25,PO | Performed by: NURSE PRACTITIONER

## 2025-01-09 PROCEDURE — 74018 RADEX ABDOMEN 1 VIEW: CPT | Mod: TC,FY,PO

## 2025-01-09 PROCEDURE — 99214 OFFICE O/P EST MOD 30 MIN: CPT | Mod: S$PBB,,, | Performed by: NURSE PRACTITIONER

## 2025-01-09 PROCEDURE — 74018 RADEX ABDOMEN 1 VIEW: CPT | Mod: 26,,, | Performed by: RADIOLOGY

## 2025-01-09 RX ORDER — AMOXICILLIN AND CLAVULANATE POTASSIUM 875; 125 MG/1; MG/1
1 TABLET, FILM COATED ORAL 2 TIMES DAILY
Qty: 20 TABLET | Refills: 0 | Status: SHIPPED | OUTPATIENT
Start: 2025-01-09

## 2025-01-09 NOTE — PROGRESS NOTES
"Subjective:       Patient ID: Cassie Srivastava is a 74 y.o. female.    CHIEF COMPLAINT:  - Ms. Srivastava presents with left-sided abdominal pain     HPI:  Ms. Srivastava reports left-sided abdominal pain localized to the left lower quadrant, exacerbated by coughing. Pain onset occurred after consuming spicy chicken wings at approximately 6:45 PM on Tuesday. The pain has persisted and did not improve after a bowel movement this morning. Ms. Srivastava has a history of constipation and is currently taking a stool softener, which she reports is ineffective. She has daily bowel movements, sometimes requiring effort to pass stool. Prior to taking the stool softener, the patient had frequent constipation. Ms. Srivastava has a history of esophagitis, though it is unclear if the current symptoms are related. Ms. Srivastava is uncertain about the relationship between the chicken wing consumption and the pain onset. She delayed seeking medical attention, hoping the symptoms would resolve spontaneously. Ms. Srivastava denies nausea, vomiting, and diarrhea.        BP (!) 150/64 (Patient Position: Sitting)   Pulse 80   Temp 98.7 °F (37.1 °C) (Tympanic)   Ht 5' 1" (1.549 m)   Wt 91 kg (200 lb 9.9 oz)   SpO2 98%   BMI 37.91 kg/m²     Review of Systems   Constitutional:  Negative for activity change, appetite change, chills, diaphoresis, fatigue, fever and unexpected weight change.   HENT: Negative.     Respiratory:  Negative for cough and shortness of breath.    Cardiovascular:  Negative for chest pain, palpitations and leg swelling.   Gastrointestinal:  Positive for abdominal pain and constipation. Negative for diarrhea, nausea and vomiting.   Genitourinary: Negative.    Musculoskeletal:  Positive for arthralgias and myalgias.   Skin:  Negative for color change, pallor, rash and wound.   Allergic/Immunologic: Negative for immunocompromised state.   Neurological: Negative.  Negative for dizziness and facial asymmetry. "   Hematological:  Negative for adenopathy. Does not bruise/bleed easily.   Psychiatric/Behavioral:  Negative for agitation, behavioral problems and confusion.        Objective:      Physical Exam  Vitals and nursing note reviewed.   Constitutional:       General: She is not in acute distress.     Appearance: She is well-developed. She is not diaphoretic.   HENT:      Head: Normocephalic and atraumatic.      Mouth/Throat:      Pharynx: No oropharyngeal exudate.   Eyes:      General:         Right eye: No discharge.         Left eye: No discharge.      Conjunctiva/sclera: Conjunctivae normal.   Cardiovascular:      Rate and Rhythm: Normal rate and regular rhythm.      Heart sounds: Normal heart sounds. No murmur heard.  Pulmonary:      Effort: Pulmonary effort is normal. No respiratory distress.      Breath sounds: Normal breath sounds. No wheezing.   Abdominal:      General: There is no distension.      Palpations: Abdomen is soft.      Tenderness: There is abdominal tenderness in the left lower quadrant.   Musculoskeletal:         General: No tenderness. Normal range of motion.   Skin:     General: Skin is warm and dry.      Findings: No erythema or rash.   Neurological:      Mental Status: She is alert and oriented to person, place, and time.   Psychiatric:         Behavior: Behavior normal.         Thought Content: Thought content normal.         Judgment: Judgment normal.         Assessment and Plan        Cassie was seen today for abdominal pain.    Diagnoses and all orders for this visit:    Left lower quadrant pain  -     X-Ray Abdomen AP 1 View; Future    Essential hypertension      There are no Patient Instructions on file for this visit.      1. Left lower quadrant pain    2. Essential hypertension        Assessment & Plan    ABDOMINAL PAIN:  - Assessed for potential diverticulitis vs. constipation as cause of left-sided abdominal pain.  - Noted that the patient reports pain in the left lower quadrant,  exacerbated by coughing and touch.  - Confirmed that the patient denies nausea, diarrhea, and vomiting associated with the pain.  - Performed physical exam revealing pain in the left lower quadrant, most prominent near the bladder area.  - Noted patient's history of diverticulitis and recent pain after consuming spicy food.  - Considered the possibility of colon inflammation as a cause of the patient's pain.    CONSTIPATION:  - Noted that the patient reports constipation and difficulty with bowel movements despite taking stool softeners.  - Plan to adjust constipation management if fecal impaction is present.  - Continue current stool softener regimen, although patient reports it as ineffective.    DIAGNOSTIC IMAGING:  - Ordered abdominal x-ray to evaluate for fecal impaction.  - Await radiologist interpretation before finalizing treatment plan.  - Plan to review x-ray results and determine treatment based on findings, potentially including antibiotics if inflammation is present.    ESOPHAGITIS:  - Continue current treatment with Protonix for esophagitis.    FOLLOW UP:  - Instructed the patient to contact the office this afternoon for x-ray results and treatment plan.  - Have someone available to  any prescribed medications from the pharmacy.          This note was generated with the assistance of ambient listening technology. Verbal consent was obtained by the patient and accompanying visitor(s) for the recording of patient appointment to facilitate this note. I attest to having reviewed and edited the generated note for accuracy, though some syntax or spelling errors may persist. Please contact the author of this note for any clarification.

## 2025-01-10 DIAGNOSIS — E89.0 POST-SURGICAL HYPOTHYROIDISM: Primary | ICD-10-CM

## 2025-01-10 RX ORDER — LEVOTHYROXINE SODIUM 100 UG/1
100 TABLET ORAL
Qty: 30 TABLET | Refills: 11 | Status: SHIPPED | OUTPATIENT
Start: 2025-01-10 | End: 2026-01-10

## 2025-01-17 ENCOUNTER — OFFICE VISIT (OUTPATIENT)
Dept: OPHTHALMOLOGY | Facility: CLINIC | Age: 75
End: 2025-01-17
Payer: MEDICARE

## 2025-01-17 DIAGNOSIS — H57.89 THYROID EYE DISEASE: Primary | ICD-10-CM

## 2025-01-17 DIAGNOSIS — Z96.1 PSEUDOPHAKIA OF BOTH EYES: ICD-10-CM

## 2025-01-17 DIAGNOSIS — H50.22 HYPOTROPIA OF LEFT EYE: ICD-10-CM

## 2025-01-17 DIAGNOSIS — H52.223 REGULAR ASTIGMATISM OF BOTH EYES WITH PRESBYOPIA: ICD-10-CM

## 2025-01-17 DIAGNOSIS — E05.00 GRAVES DISEASE: ICD-10-CM

## 2025-01-17 DIAGNOSIS — H43.813 POSTERIOR VITREOUS DETACHMENT OF BOTH EYES: ICD-10-CM

## 2025-01-17 DIAGNOSIS — H05.20 OCULAR PROPTOSIS: ICD-10-CM

## 2025-01-17 DIAGNOSIS — E07.9 THYROID EYE DISEASE: Primary | ICD-10-CM

## 2025-01-17 DIAGNOSIS — H53.2 DIPLOPIA: ICD-10-CM

## 2025-01-17 DIAGNOSIS — H40.9 GLAUCOMA OF BOTH EYES, UNSPECIFIED GLAUCOMA TYPE: ICD-10-CM

## 2025-01-17 DIAGNOSIS — H52.4 REGULAR ASTIGMATISM OF BOTH EYES WITH PRESBYOPIA: ICD-10-CM

## 2025-01-17 PROCEDURE — 99999 PR PBB SHADOW E&M-EST. PATIENT-LVL V: CPT | Mod: PBBFAC,,, | Performed by: OPTOMETRIST

## 2025-01-17 PROCEDURE — 92015 DETERMINE REFRACTIVE STATE: CPT | Mod: ,,, | Performed by: OPTOMETRIST

## 2025-01-17 PROCEDURE — 99215 OFFICE O/P EST HI 40 MIN: CPT | Mod: PBBFAC,PO | Performed by: OPTOMETRIST

## 2025-01-17 PROCEDURE — 99204 OFFICE O/P NEW MOD 45 MIN: CPT | Mod: S$PBB,,, | Performed by: OPTOMETRIST

## 2025-01-17 RX ORDER — BIMATOPROST 0.1 MG/ML
1 SOLUTION/ DROPS OPHTHALMIC NIGHTLY
Qty: 7.5 ML | Refills: 4 | Status: SHIPPED | OUTPATIENT
Start: 2025-01-17 | End: 2025-01-30 | Stop reason: SDUPTHER
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
## 2025-01-17 NOTE — PATIENT INSTRUCTIONS
Phone number to call to schedule CT scan- [phone]    Let representative know you have labs schedule to do BEFORE CT scan. They should help you schedule that.       [place] location for CT scan & labs     [address] [place] [place] [address]

## 2025-01-30 DIAGNOSIS — H40.9 GLAUCOMA OF BOTH EYES, UNSPECIFIED GLAUCOMA TYPE: ICD-10-CM

## 2025-01-30 RX ORDER — BIMATOPROST 0.1 MG/ML
1 SOLUTION/ DROPS OPHTHALMIC NIGHTLY
Qty: 7.5 ML | Refills: 4 | Status: SHIPPED | OUTPATIENT
Start: 2025-01-30

## 2025-02-05 ENCOUNTER — LAB VISIT (OUTPATIENT)
Dept: LAB | Facility: HOSPITAL | Age: 75
End: 2025-02-05
Attending: OPTOMETRIST
Payer: MEDICARE

## 2025-02-05 DIAGNOSIS — H57.89 THYROID EYE DISEASE: ICD-10-CM

## 2025-02-05 DIAGNOSIS — E05.00 GRAVES DISEASE: ICD-10-CM

## 2025-02-05 DIAGNOSIS — E07.9 THYROID EYE DISEASE: ICD-10-CM

## 2025-02-05 LAB
BUN SERPL-MCNC: 22 MG/DL (ref 8–23)
CREAT SERPL-MCNC: 0.8 MG/DL (ref 0.5–1.4)
EST. GFR  (NO RACE VARIABLE): >60 ML/MIN/1.73 M^2

## 2025-02-05 PROCEDURE — 84520 ASSAY OF UREA NITROGEN: CPT | Performed by: OPTOMETRIST

## 2025-02-05 PROCEDURE — 36415 COLL VENOUS BLD VENIPUNCTURE: CPT | Mod: PN | Performed by: OPTOMETRIST

## 2025-02-05 PROCEDURE — 82565 ASSAY OF CREATININE: CPT | Performed by: OPTOMETRIST

## 2025-02-07 DIAGNOSIS — J44.89 ASTHMA, CHRONIC OBSTRUCTIVE, WITHOUT STATUS ASTHMATICUS: Primary | ICD-10-CM

## 2025-02-10 ENCOUNTER — HOSPITAL ENCOUNTER (OUTPATIENT)
Dept: RADIOLOGY | Facility: HOSPITAL | Age: 75
Discharge: HOME | End: 2025-02-10
Attending: OPTOMETRIST
Payer: MEDICARE

## 2025-02-10 DIAGNOSIS — E05.00 GRAVES DISEASE: ICD-10-CM

## 2025-02-10 DIAGNOSIS — H57.89 THYROID EYE DISEASE: ICD-10-CM

## 2025-02-10 DIAGNOSIS — E07.9 THYROID EYE DISEASE: ICD-10-CM

## 2025-02-10 PROCEDURE — 25500020 PHARM REV CODE 255: Mod: PN | Performed by: OPTOMETRIST

## 2025-02-10 PROCEDURE — 70482 CT ORBIT/EAR/FOSSA W/O&W/DYE: CPT | Mod: TC,PN

## 2025-02-10 PROCEDURE — 70482 CT ORBIT/EAR/FOSSA W/O&W/DYE: CPT | Mod: 26,,, | Performed by: RADIOLOGY

## 2025-02-10 RX ORDER — LEVALBUTEROL TARTRATE 45 UG/1
1-2 AEROSOL, METERED ORAL EVERY 8 HOURS PRN
Qty: 15 G | Refills: 2 | Status: SHIPPED | OUTPATIENT
Start: 2025-02-10

## 2025-02-10 RX ADMIN — IOHEXOL 75 ML: 350 INJECTION, SOLUTION INTRAVENOUS at 09:02

## 2025-02-19 ENCOUNTER — TELEPHONE (OUTPATIENT)
Dept: PULMONOLOGY | Facility: CLINIC | Age: 75
End: 2025-02-19
Payer: MEDICARE

## 2025-02-19 ENCOUNTER — PATIENT MESSAGE (OUTPATIENT)
Dept: CARDIOLOGY | Facility: HOSPITAL | Age: 75
End: 2025-02-19
Payer: MEDICARE

## 2025-02-19 ENCOUNTER — OFFICE VISIT (OUTPATIENT)
Dept: CARDIOLOGY | Facility: CLINIC | Age: 75
End: 2025-02-19
Payer: MEDICARE

## 2025-02-19 VITALS
DIASTOLIC BLOOD PRESSURE: 88 MMHG | WEIGHT: 204.81 LBS | HEIGHT: 61 IN | HEART RATE: 49 BPM | BODY MASS INDEX: 38.67 KG/M2 | SYSTOLIC BLOOD PRESSURE: 172 MMHG | OXYGEN SATURATION: 97 %

## 2025-02-19 DIAGNOSIS — G47.30 SLEEP APNEA WITH USE OF CONTINUOUS POSITIVE AIRWAY PRESSURE (CPAP): ICD-10-CM

## 2025-02-19 DIAGNOSIS — I10 ESSENTIAL HYPERTENSION: ICD-10-CM

## 2025-02-19 DIAGNOSIS — E66.01 SEVERE OBESITY (BMI 35.0-39.9) WITH COMORBIDITY: ICD-10-CM

## 2025-02-19 DIAGNOSIS — I27.20 PULMONARY HYPERTENSION: ICD-10-CM

## 2025-02-19 DIAGNOSIS — J43.1 PANLOBULAR EMPHYSEMA: Primary | ICD-10-CM

## 2025-02-19 DIAGNOSIS — I48.20 CHRONIC ATRIAL FIBRILLATION: ICD-10-CM

## 2025-02-19 PROCEDURE — 99999 PR PBB SHADOW E&M-EST. PATIENT-LVL V: CPT | Mod: PBBFAC,,, | Performed by: INTERNAL MEDICINE

## 2025-02-19 PROCEDURE — 93005 ELECTROCARDIOGRAM TRACING: CPT | Mod: PO

## 2025-02-19 PROCEDURE — 99205 OFFICE O/P NEW HI 60 MIN: CPT | Mod: S$PBB,,, | Performed by: INTERNAL MEDICINE

## 2025-02-19 PROCEDURE — 99215 OFFICE O/P EST HI 40 MIN: CPT | Mod: PBBFAC,25,PO | Performed by: INTERNAL MEDICINE

## 2025-02-19 PROCEDURE — 93010 ELECTROCARDIOGRAM REPORT: CPT | Mod: ,,, | Performed by: INTERNAL MEDICINE

## 2025-02-19 RX ORDER — HYDROCHLOROTHIAZIDE 12.5 MG/1
12.5 TABLET ORAL DAILY
Qty: 30 TABLET | Refills: 11 | Status: SHIPPED | OUTPATIENT
Start: 2025-02-19 | End: 2026-02-19

## 2025-02-19 NOTE — PROGRESS NOTES
Subjective:   Patient ID:  Cassie Srivastava is a 75 y.o. female who presents for follow-up of No chief complaint on file.  Pt to establish care. Pt with hx of afib and cardioversion 4-5 years ago in [place].  Pt with recent palpitations last few nights, waking pt up. Last a few minutes  Patient denies CP, angina or anginal equivalent.  Pt states norvasc makes her dizzy  EKG sinus bradycardia    Atrial Fibrillation  Presents for follow-up visit. Symptoms include dizziness and palpitations. Symptoms are negative for bradycardia, chest pain, hypotension, shortness of breath, syncope, tachycardia and weakness. The symptoms have been stable. Past medical history includes atrial fibrillation. There are no medication compliance problems.   Dizziness:   Chronicity:  New  Onset:  In the past 7 days  Progression since onset:  Waxing and waning  Frequency:  Every few days  Severity:  Mild  Duration:  Very brief  Dizziness characteristics:  Off-balance  Frequency of Spells:  Daily   Associated symptoms: palpitations.no weakness, no syncope and no chest pain.  Aggravated by:  Getting up  Treatments tried:  Rest  Improvements on treatment:  Mild    Hypertension  This is a chronic problem. The current episode started more than 1 year ago. The problem has been gradually improving since onset. The problem is controlled. Pertinent negatives include no chest pain, palpitations or shortness of breath. Past treatments include Ca channel blockers and ace (-). The current treatment provides moderate improvement. There are no compliance problems.     Review of Systems   Constitutional: Negative. Negative for weight gain.   HENT: Negative.     Eyes: Negative.    Cardiovascular:  Positive for palpitations. Negative for chest pain, leg swelling and syncope.   Respiratory: Negative.  Negative for shortness of breath.    Endocrine: Negative.    Hematologic/Lymphatic: Negative.    Skin: Negative.    Musculoskeletal:  Negative for muscle  weakness.   Gastrointestinal: Negative.    Genitourinary: Negative.    Neurological:  Positive for dizziness. Negative for weakness.   Psychiatric/Behavioral: Negative.     Allergic/Immunologic: Negative.    All other systems reviewed and are negative.    Family History   Problem Relation Name Age of Onset    Glaucoma Mother      Glaucoma Sister      Cataracts Sister      Diabetes Sister      Hypertension Sister      Glaucoma Brother      Cataracts Brother      Hypertension Brother      Stroke Brother       Past Medical History:   Diagnosis Date    A-fib     Acquired hypercoagulable state     Arthritis     COPD (chronic obstructive pulmonary disease)     Diastolic heart failure     Glaucoma     Graves disease     Heart failure, unspecified     Hypertension     Hypertensive heart disease with chronic diastolic congestive heart failure     Major depression in remission     MELITON on CPAP     Pulmonary hypertension     Strabismus      Social History[1]  Medications Ordered Prior to Encounter[2]  Review of patient's allergies indicates:   Allergen Reactions    Codeine Nausea Only and Other (See Comments)       Objective:     Physical Exam  Vitals and nursing note reviewed.   Constitutional:       Appearance: She is well-developed.   HENT:      Head: Normocephalic and atraumatic.   Eyes:      Conjunctiva/sclera: Conjunctivae normal.      Pupils: Pupils are equal, round, and reactive to light.   Cardiovascular:      Rate and Rhythm: Normal rate and regular rhythm.      Pulses: Intact distal pulses.      Heart sounds: Normal heart sounds.   Pulmonary:      Effort: Pulmonary effort is normal.      Breath sounds: Normal breath sounds.   Abdominal:      General: Bowel sounds are normal.      Palpations: Abdomen is soft.   Musculoskeletal:         General: Normal range of motion.      Cervical back: Normal range of motion and neck supple.   Skin:     General: Skin is warm and dry.   Neurological:      Mental Status: She is alert  and oriented to person, place, and time.         Assessment:     1. Panlobular emphysema    2. Chronic atrial fibrillation    3. Severe obesity (BMI 35.0-39.9) with comorbidity    4. Pulmonary hypertension    5. Essential hypertension    6. Sleep apnea with use of continuous positive airway pressure (CPAP)        Plan:     Panlobular emphysema    Chronic atrial fibrillation  Comments:  rate controlled, on eliquis.  refer to cardiology  Orders:  -     Ambulatory referral/consult to Cardiology    Severe obesity (BMI 35.0-39.9) with comorbidity    Pulmonary hypertension    Essential hypertension    Sleep apnea with use of continuous positive airway pressure (CPAP)        Continue xarelto, tikosyn -  afib  Continue  lisinopril- HTN  Hold norvasc  Cardiac monitor         [1]   Social History  Socioeconomic History    Marital status:     Number of children: 2   Tobacco Use    Smoking status: Never     Passive exposure: Past    Smokeless tobacco: Never    Tobacco comments:     Father was a heavy smoker.    Substance and Sexual Activity    Alcohol use: Not Currently    Drug use: Not Currently     Social Drivers of Health     Financial Resource Strain: Medium Risk (2/14/2025)    Overall Financial Resource Strain (CARDIA)     Difficulty of Paying Living Expenses: Somewhat hard   Food Insecurity: No Food Insecurity (2/14/2025)    Hunger Vital Sign     Worried About Running Out of Food in the Last Year: Never true     Ran Out of Food in the Last Year: Never true   Transportation Needs: Unmet Transportation Needs (2/14/2025)    PRAPARE - Transportation     Lack of Transportation (Medical): Yes     Lack of Transportation (Non-Medical): Yes   Physical Activity: Insufficiently Active (2/14/2025)    Exercise Vital Sign     Days of Exercise per Week: 2 days     Minutes of Exercise per Session: 30 min   Stress: Stress Concern Present (2/14/2025)    [employer] [place] [employer] - Occupational Stress Questionnaire      Feeling of Stress : To some extent   Housing Stability: Low Risk  (2/14/2025)    Housing Stability Vital Sign     Unable to Pay for Housing in the Last Year: No     Homeless in the Last Year: No   [2]   Current Outpatient Medications on File Prior to Visit   Medication Sig Dispense Refill    acetaminophen (TYLENOL) 325 MG tablet Take 500 mg by mouth.      ADVAIR DISKUS 500-50 mcg/dose DsDv diskus inhaler       amLODIPine (NORVASC) 5 MG tablet Take 1 tablet (5 mg total) by mouth once daily. 90 tablet 0    ascorbic acid, vitamin C, 1,000 mg TbSR Take 2,000 mg by mouth.      atorvastatin (LIPITOR) 10 MG tablet Take 10 mg by mouth once daily.      bimatoprost (LUMIGAN) 0.01 % Drop Place 1 drop into both eyes every evening. 7.5 mL 4    cetirizine (ZYRTEC) 10 MG tablet Take 10 mg by mouth.      cholecalciferol, vitamin D3, 125 mcg (5,000 unit) Tab Take by mouth once daily.      docusate sodium (COLACE) 100 MG capsule Take 100 mg by mouth.      dofetilide (TIKOSYN) 500 MCG Cap Take 125 mcg by mouth every 12 (twelve) hours.      fluticasone (FLONASE) 50 mcg/actuation nasal spray       fluticasone (VERAMYST) 27.5 mcg/actuation nasal spray 2 sprays by Nasal route once daily.      furosemide (LASIX) 20 MG tablet Take 20 mg by mouth 2 (two) times daily.      levalbuterol (XOPENEX HFA) 45 mcg/actuation inhaler Inhale 1-2 puffs into the lungs every 8 (eight) hours as needed for Wheezing. Rescue 15 g 2    levalbuterol (XOPENEX) 0.63 mg/3 mL nebulizer solution       levothyroxine (SYNTHROID) 100 MCG tablet Take 1 tablet (100 mcg total) by mouth before breakfast. 30 tablet 11    lisinopriL (PRINIVIL,ZESTRIL) 20 MG tablet Take 20 mg by mouth 2 (two) times a day.      magnesium oxide (MAG-OX) 400 mg (241.3 mg magnesium) tablet Take 1 tablet by mouth every morning.      montelukast (SINGULAIR) 10 mg tablet Take 1 tablet by mouth every evening.      omeprazole (PRILOSEC) 10 MG capsule Take 10 mg by mouth as needed.      potassium  citrate (UROCIT-K) 10 mEq (1,080 mg) TbSR       rivaroxaban (XARELTO) 20 mg Tab Take 1 tablet (20 mg total) by mouth daily with dinner or evening meal. 90 tablet 3    tezepelumab-ekko (TEZSPIRE) 210 mg/1.91 mL (110 mg/mL) PnIj Inject into the skin.      zinc gluconate 50 mg tablet Take 1 tablet by mouth every morning.      amoxicillin-clavulanate 875-125mg (AUGMENTIN) 875-125 mg per tablet Take 1 tablet by mouth 2 (two) times daily. 20 tablet 0    benzonatate (TESSALON) 100 MG capsule Take 200 mg by mouth.      dupilumab (DUPIXENT) 300 mg/2 mL Syrg Inject 2 mLs (300 mg total) into the skin every 28 days. (Patient not taking: Reported on 2/19/2025) 2 mL 11     No current facility-administered medications on file prior to visit.

## 2025-02-20 LAB
OHS QRS DURATION: 72 MS
OHS QTC CALCULATION: 451 MS

## 2025-02-24 ENCOUNTER — CLINICAL SUPPORT (OUTPATIENT)
Dept: PULMONOLOGY | Facility: CLINIC | Age: 75
End: 2025-02-24
Payer: MEDICARE

## 2025-02-24 ENCOUNTER — LAB VISIT (OUTPATIENT)
Dept: LAB | Facility: HOSPITAL | Age: 75
End: 2025-02-24
Attending: INTERNAL MEDICINE
Payer: MEDICARE

## 2025-02-24 ENCOUNTER — OFFICE VISIT (OUTPATIENT)
Dept: PULMONOLOGY | Facility: CLINIC | Age: 75
End: 2025-02-24
Payer: MEDICARE

## 2025-02-24 VITALS
HEIGHT: 61 IN | OXYGEN SATURATION: 94 % | BODY MASS INDEX: 38.67 KG/M2 | WEIGHT: 204.81 LBS | SYSTOLIC BLOOD PRESSURE: 134 MMHG | DIASTOLIC BLOOD PRESSURE: 78 MMHG | RESPIRATION RATE: 21 BRPM | HEART RATE: 59 BPM

## 2025-02-24 DIAGNOSIS — J43.1 PANLOBULAR EMPHYSEMA: ICD-10-CM

## 2025-02-24 DIAGNOSIS — I27.20 PULMONARY HYPERTENSION: ICD-10-CM

## 2025-02-24 DIAGNOSIS — J45.50 SEVERE PERSISTENT ASTHMA WITHOUT COMPLICATION: ICD-10-CM

## 2025-02-24 DIAGNOSIS — J45.50 SEVERE PERSISTENT ASTHMA WITHOUT COMPLICATION: Primary | ICD-10-CM

## 2025-02-24 PROCEDURE — 36415 COLL VENOUS BLD VENIPUNCTURE: CPT | Mod: PO | Performed by: INTERNAL MEDICINE

## 2025-02-24 PROCEDURE — 99999 PR PBB SHADOW E&M-EST. PATIENT-LVL IV: CPT | Mod: PBBFAC,,, | Performed by: STUDENT IN AN ORGANIZED HEALTH CARE EDUCATION/TRAINING PROGRAM

## 2025-02-24 PROCEDURE — 99214 OFFICE O/P EST MOD 30 MIN: CPT | Mod: PBBFAC,PO | Performed by: STUDENT IN AN ORGANIZED HEALTH CARE EDUCATION/TRAINING PROGRAM

## 2025-02-24 PROCEDURE — 80048 BASIC METABOLIC PNL TOTAL CA: CPT | Performed by: INTERNAL MEDICINE

## 2025-02-24 PROCEDURE — 95012 NITRIC OXIDE EXP GAS DETER: CPT | Mod: PBBFAC,PO

## 2025-02-24 PROCEDURE — 99999PBSHW PR PBB SHADOW TECHNICAL ONLY FILED TO HB: Mod: PBBFAC,,,

## 2025-02-24 PROCEDURE — 94060 EVALUATION OF WHEEZING: CPT | Mod: PBBFAC,PO

## 2025-02-24 RX ORDER — FLUTICASONE PROPIONATE AND SALMETEROL 250; 50 UG/1; UG/1
1 POWDER RESPIRATORY (INHALATION) 2 TIMES DAILY
Qty: 720 EACH | Refills: 0 | Status: SHIPPED | OUTPATIENT
Start: 2025-02-24 | End: 2026-02-24

## 2025-02-24 RX ORDER — ALBUTEROL SULFATE 90 UG/1
2 INHALANT RESPIRATORY (INHALATION) EVERY 4 HOURS PRN
Qty: 1 G | Refills: 3 | Status: SHIPPED | OUTPATIENT
Start: 2025-02-24 | End: 2025-02-24

## 2025-02-24 RX ORDER — FLUTICASONE PROPIONATE AND SALMETEROL 50; 500 UG/1; UG/1
1 POWDER RESPIRATORY (INHALATION) 2 TIMES DAILY
Qty: 180 EACH | Refills: 4 | Status: SHIPPED | OUTPATIENT
Start: 2025-02-24 | End: 2025-02-24

## 2025-02-24 RX ORDER — IPRATROPIUM BROMIDE AND ALBUTEROL SULFATE 2.5; .5 MG/3ML; MG/3ML
3 SOLUTION RESPIRATORY (INHALATION) EVERY 6 HOURS PRN
Qty: 1 EACH | Refills: 6 | Status: SHIPPED | OUTPATIENT
Start: 2025-02-24 | End: 2026-02-24

## 2025-02-24 RX ORDER — FLUTICASONE PROPIONATE AND SALMETEROL 50; 500 UG/1; UG/1
1 POWDER RESPIRATORY (INHALATION) 2 TIMES DAILY
Qty: 180 EACH | Refills: 4 | Status: SHIPPED | OUTPATIENT
Start: 2025-02-24

## 2025-02-24 RX ORDER — LEVALBUTEROL INHALATION SOLUTION 0.63 MG/3ML
1 SOLUTION RESPIRATORY (INHALATION) 2 TIMES DAILY PRN
Qty: 1 EACH | Refills: 6 | Status: SHIPPED | OUTPATIENT
Start: 2025-02-24 | End: 2026-02-24

## 2025-02-24 RX ORDER — ALBUTEROL SULFATE 90 UG/1
2 INHALANT RESPIRATORY (INHALATION) EVERY 4 HOURS PRN
Qty: 1 G | Refills: 3 | Status: SHIPPED | OUTPATIENT
Start: 2025-02-24

## 2025-02-24 NOTE — PROGRESS NOTES
Chief complaint:  Chief Complaint   Patient presents with    Cough    Follow-up        History of present illness -  Established clinic patient with a history of severe persistent asthma on biologic therapy, refractory atrial fibrillation and heart failure with preserved ejection fraction.  Who established care in our clinic in December of 2024.  Came to clinic with lingering cough persistent wheezing, this exacerbation was triggered by an viral infection.    Cough    Follow-up  Associated symptoms include coughing.      Interval history   02/24/2025   Patient comes to follow-up with a dramatic resolution of the cough, she completed steroid therapy but unfortunately has not been able to get this inhaler or biologic therapy.  Recently saw Cardiology who adjusted her cardiac medications.  To the ER twice for palpitations and vertigo, started the patient on meclizine and her symptoms have somewhat improved.  Is still wheezes occasionally but nothing like the severity she had in December of 2024.    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   Constitutional:       Appearance: Normal appearance.   HENT:      Head: Normocephalic and atraumatic.      Nose: Nose normal.      Mouth/Throat:      Mouth: Mucous membranes are moist.   Eyes:      Pupils: Pupils are equal, round, and reactive to light.   Cardiovascular:      Rate and Rhythm: Normal rate and regular rhythm.      Pulses: Normal pulses.      Heart sounds: Normal heart sounds.   Pulmonary:      Effort: Pulmonary effort is normal.      Breath sounds: Normal breath sounds.   Abdominal:      General: Abdomen is flat.      Palpations: Abdomen is soft.  "  Musculoskeletal:         General: No swelling.   Skin:     General: Skin is warm.      Capillary Refill: Capillary refill takes less than 2 seconds.   Neurological:      General: No focal deficit present.      Mental Status: She is alert.        Vitals:    25 1309   BP: 134/78   BP Location: Left arm   Patient Position: Sitting   Pulse: (!) 59   Resp: (!) 21   SpO2: (!) 94%   Weight: 92.9 kg (204 lb 12.9 oz)   Height: 5' 1" (1.549 m)      Review of Systems   Constitutional: Negative.    HENT: Negative.     Eyes: Negative.    Respiratory:  Positive for cough.    Cardiovascular: Negative.    Gastrointestinal: Negative.    Musculoskeletal: Negative.    Skin: Negative.    Neurological: Negative.        Relevant data (Independently reviewed by me) -    Prior notes -   Hospitalization, prior pulmonary notes    Labs -   CBC on 2024 while she was not ready on steroids showed a decreased white count and an eosinophil level of 0.    CMP was essentially unremarkable    Spirometry -  Not available    Imaging -   A report from an x-ray performed at [facility] on 2024 showed mild cardiac silhouette enlargement.    Assessment & Plan    Severe persistent asthma without complication  -     Cancel: Fraction of  Nitric Oxide; Future; Expected date: 2025  -     Cancel: Spirometry with/without bronchodilator; Future; Expected date: 2025  -     Fraction of  Nitric Oxide; Future; Expected date: 2025  -     Spirometry with/without bronchodilator; Future; Expected date: 2025    Other orders  -     Discontinue: albuterol (VENTOLIN HFA) 90 mcg/actuation inhaler; Inhale 2 puffs into the lungs every 4 (four) hours as needed for Wheezing. Rescue  Dispense: 1 g; Refill: 3  -     albuterol-ipratropium (DUO-NEB) 2.5 mg-0.5 mg/3 mL nebulizer solution; Take 3 mLs by nebulization every 6 (six) hours as needed for Wheezing. Rescue  Dispense: 1 each; Refill: 6  -     " fluticasone-salmeterol diskus inhaler 250-50 mcg; Inhale 1 puff into the lungs 2 (two) times daily. Controller  Dispense: 720 each; Refill: 0  -     Discontinue: ADVAIR DISKUS 500-50 mcg/dose DsDv diskus inhaler; Inhale 1 puff into the lungs 2 (two) times daily.  Dispense: 180 each; Refill: 4  -     ADVAIR DISKUS 500-50 mcg/dose DsDv diskus inhaler; Inhale 1 puff into the lungs 2 (two) times daily.  Dispense: 180 each; Refill: 4  -     albuterol (VENTOLIN HFA) 90 mcg/actuation inhaler; Inhale 2 puffs into the lungs every 4 (four) hours as needed for Wheezing. Rescue  Dispense: 1 g; Refill: 3  -     levalbuterol (XOPENEX) 0.63 mg/3 mL nebulizer solution; Take 3 mLs (0.63 mg total) by nebulization 2 (two) times daily as needed for Wheezing.  Dispense: 1 each; Refill: 6    I will try to qualify the patient again for her maintenance inhaler she was reportedly denied by her insurance was thankfully has not had recurrence of her asthma exacerbation.  Using her rescue inhaler as needed, but running out soon.  Still working on getting patient assistance program for Dupixent.    Spirometry and fractional excretion of nitric oxide is pending    Continue same fluticasone spray for allergic rhinitis.    She has a history of MELITON of uncertain severity, when we get access to her outside records we will migrate her care to us.    RTC in 6 months    Shon Cruz   02/24/2025

## 2025-02-24 NOTE — PROCEDURES
Clinical Guide to Interpretation or FeNO Levels :    FeNO  (ppb) LOW INTERMEDIATE HIGH   ADULT VALUES < 25 25-50          > 50   Th2-driven Inflammation Unlikely Likely Significant     Patients FeNO level at this visit : 20 (ppb)    Interpretation of FeNO measurement in adults:  [x] FENO is less than 25 ppb implies non eosinophilic airway inflammation or the absence of airway inflammation.    Comment: Low FENO (<25 ppb) in adult asthmatics with persistent symptoms suggests other etiologies for these symptoms and a lower likelihood of benefit from adding or increasing inhaled glucocorticoids.    Discussion:  A FENO less than 25 ppb in adults and less than 20 ppb in children younger than 12 years of age implies noneosinophilic airway inflammation or the absence of airway inflammation.  A FENO greater than 50 ppb in adults or greater than 35 ppb in children suggests eosinophilic airway inflammation.   Values of FENO between 25 and 50 ppb in adults (20 to 35 ppb in children) should be interpreted cautiously with reference to the clinical situation (eg, symptomatic, on or off therapy, current smoking).  A rising FENO with a greater than 20 percent change and more than 25 ppb (20 ppb in children) from a previously stable level suggests increasing eosinophilic airway inflammation, but there are wide inter-individual differences.  A decrease in FENO greater than 20 percent for values over 50 ppb or more than 10 ppb for values less than 50 ppb may be clinically important.  ?FENO in other respiratory diseases - Several other diseases are associated with altered levels of exhaled NO: low levels of FENO have been noted in cystic fibrosis, current smoking, pulmonary hypertension, hypothermia, primary ciliary dyskinesia, and bronchopulmonary dysplasia. Elevated FENO has been noted in atopy, nonasthmatic eosinophilic bronchitis, COPD exacerbations, noncystic fibrosis bronchiectasis, and viral upper respiratory  infections.    REFERENCE:  ATS Board of Directors, December 2004, and by the ERS Executive Committee, June 2004. ATS/ERS Recommendations for Standardized Procedures for the Online and Offline Measurement of Exhaled Lower Respiratory Nitric Oxide and Nasal Nitric Oxide. Guideline 2005

## 2025-02-24 NOTE — PROGRESS NOTES
Clinical Guide to Interpretation or FeNO Levels :    FeNO  (ppb) LOW INTERMEDIATE HIGH   ADULT VALUES < 25 25-50          > 50   Th2-driven Inflammation Unlikely Likely Significant     Patients FeNO level at this visit : 20 (ppb)    Interpretation of FeNO measurement in adults:  [] FENO is less than 25 ppb implies non eosinophilic airway inflammation or the absence of airway inflammation.    Comment: Low FENO (<25 ppb) in adult asthmatics with persistent symptoms suggests other etiologies for these symptoms and a lower likelihood of benefit from adding or increasing inhaled glucocorticoids.    [] FENO between 25 and 50 ppb in adults should be interpreted cautiously with reference to the clinical situation (eg, symptomatic, on or off therapy, current smoking).    [] FENO greater than 50 ppb in adults  suggests eosinophilic airway inflammation   Comment: High FENO (>50 ppb) in adult asthmatics even with atypical symptoms suggests glucocorticoid responsiveness. High FENO (>50 ppb) can help identify poor adherence or uncontrolled inflammation in asthma patients with otherwise seemingly controlled asthma.    Discussion:  A FENO less than 25 ppb in adults and less than 20 ppb in children younger than 12 years of age implies noneosinophilic airway inflammation or the absence of airway inflammation.  A FENO greater than 50 ppb in adults or greater than 35 ppb in children suggests eosinophilic airway inflammation.   Values of FENO between 25 and 50 ppb in adults (20 to 35 ppb in children) should be interpreted cautiously with reference to the clinical situation (eg, symptomatic, on or off therapy, current smoking).  A rising FENO with a greater than 20 percent change and more than 25 ppb (20 ppb in children) from a previously stable level suggests increasing eosinophilic airway inflammation, but there are wide inter-individual differences.  A decrease in FENO greater than 20 percent for values over 50 ppb or more than 10  ppb for values less than 50 ppb may be clinically important.  ?FENO in other respiratory diseases - Several other diseases are associated with altered levels of exhaled NO: low levels of FENO have been noted in cystic fibrosis, current smoking, pulmonary hypertension, hypothermia, primary ciliary dyskinesia, and bronchopulmonary dysplasia. Elevated FENO has been noted in atopy, nonasthmatic eosinophilic bronchitis, COPD exacerbations, noncystic fibrosis bronchiectasis, and viral upper respiratory infections.    REFERENCE:  ATS Board of Directors, December 2004, and by the ERS Executive Committee, June 2004. ATS/ERS Recommendations for Standardized Procedures for the Online and Offline Measurement of Exhaled Lower Respiratory Nitric Oxide and Nasal Nitric Oxide. Guideline 2005

## 2025-02-25 ENCOUNTER — TELEPHONE (OUTPATIENT)
Dept: CARDIOLOGY | Facility: CLINIC | Age: 75
End: 2025-02-25
Payer: MEDICARE

## 2025-02-25 ENCOUNTER — RESULTS FOLLOW-UP (OUTPATIENT)
Dept: CARDIOLOGY | Facility: HOSPITAL | Age: 75
End: 2025-02-25
Payer: MEDICARE

## 2025-02-25 LAB
ANION GAP SERPL CALC-SCNC: 6 MMOL/L (ref 8–16)
BUN SERPL-MCNC: 19 MG/DL (ref 8–23)
CALCIUM SERPL-MCNC: 9.2 MG/DL (ref 8.7–10.5)
CHLORIDE SERPL-SCNC: 106 MMOL/L (ref 95–110)
CO2 SERPL-SCNC: 28 MMOL/L (ref 23–29)
CREAT SERPL-MCNC: 0.7 MG/DL (ref 0.5–1.4)
EST. GFR  (NO RACE VARIABLE): >60 ML/MIN/1.73 M^2
GLUCOSE SERPL-MCNC: 83 MG/DL (ref 70–110)
POTASSIUM SERPL-SCNC: 4.2 MMOL/L (ref 3.5–5.1)
SODIUM SERPL-SCNC: 140 MMOL/L (ref 136–145)

## 2025-02-25 NOTE — TELEPHONE ENCOUNTER
Followed up with Cassie, scheduled appointment for patient. Patient verbalized understanding of date, time, and location of appointment.

## 2025-02-25 NOTE — TELEPHONE ENCOUNTER
----- Message from Allison sent at 2/25/2025 12:35 PM CST -----  Contact: self  Patient is requesting a call back regarding heart monitor. Please call back at [phone]

## 2025-02-25 NOTE — TELEPHONE ENCOUNTER
Patient has been notified of the test results----- Message from Carlos Stafford MD sent at 2/25/2025  4:35 AM CST -----  Bmp wnl  ----- Message -----  From: Irving, Soft Lab Interface  Sent: 2/25/2025   2:39 AM CST  To: Carlos Stafford MD

## 2025-02-26 LAB
BRPFT: NORMAL
FEF 25 75 CHG: 20.1 %
FEF 25 75 LLN: 0.87
FEF 25 75 POST REF: 59.4 %
FEF 25 75 PRE REF: 49.5 %
FEF 25 75 REF: 2.27
FET100 CHG: -4.5 %
FEV1 CHG: 1.4 %
FEV1 FVC CHG: 2.6 %
FEV1 FVC LLN: 64
FEV1 FVC POST REF: 98.6 %
FEV1 FVC PRE REF: 96.1 %
FEV1 FVC REF: 78
FEV1 LLN: 1.31
FEV1 POST REF: 83 %
FEV1 PRE REF: 81.9 %
FEV1 REF: 1.84
FVC CHG: -1.1 %
FVC LLN: 1.7
FVC POST REF: 83.5 %
FVC PRE REF: 84.5 %
FVC REF: 2.38
PEF CHG: 19.3 %
PEF LLN: 2.53
PEF POST REF: 116.5 %
PEF PRE REF: 97.6 %
PEF REF: 4.35
POST FEF 25 75: 1.35 L/S (ref 0.87–3.67)
POST FET 100: 6.33 SEC
POST FEV1 FVC: 76.71 % (ref 63.64–90.08)
POST FEV1: 1.53 L (ref 1.31–2.35)
POST FVC: 1.99 L (ref 1.7–3.11)
POST PEF: 5.07 L/S (ref 2.53–6.18)
PRE FEF 25 75: 1.12 L/S (ref 0.87–3.67)
PRE FET 100: 6.63 SEC
PRE FEV1 FVC: 74.79 % (ref 63.64–90.08)
PRE FEV1: 1.51 L (ref 1.31–2.35)
PRE FVC: 2.01 L (ref 1.7–3.11)
PRE PEF: 4.25 L/S (ref 2.53–6.18)

## 2025-03-06 ENCOUNTER — PATIENT MESSAGE (OUTPATIENT)
Dept: ADMINISTRATIVE | Facility: HOSPITAL | Age: 75
End: 2025-03-06
Payer: MEDICARE

## 2025-03-06 NOTE — TELEPHONE ENCOUNTER
----- Message from Giana sent at 3/6/2025 10:38 AM CST -----  Contact: Cassie  .Type:  RX Refill RequestWho Called: ArmindaRefill or New Rx:refillRX Name and Strength:dofetilide (TIKOSYN) 500 MCG CapHow is the patient currently taking it? (ex. 1XDay):Route: Take 125 mcg by mouth every 12 (twelve) hourIs this a 30 day or 90 day RX:90 day supply with 3 refillsPreferred Pharmacy with phone number:[facility] [address] [facility] , [place] [phone] fax: [phone] or Mail Order:mailOrdering Provider:RADHA EVANS STAFFWould the patient rather a call back or a response via [facility]? Call backBest Call Back Number:.[phone] Additional Information: [email or web address]

## 2025-03-07 RX ORDER — DOFETILIDE 0.5 MG/1
500 CAPSULE ORAL EVERY 12 HOURS
Qty: 180 CAPSULE | Refills: 3 | Status: SHIPPED | OUTPATIENT
Start: 2025-03-07 | End: 2025-03-07 | Stop reason: SDUPTHER

## 2025-03-07 RX ORDER — DOFETILIDE 0.5 MG/1
500 CAPSULE ORAL EVERY 12 HOURS
Qty: 180 CAPSULE | Refills: 3 | Status: CANCELLED | OUTPATIENT
Start: 2025-03-07

## 2025-03-07 RX ORDER — DOFETILIDE 0.5 MG/1
500 CAPSULE ORAL EVERY 12 HOURS
Qty: 180 CAPSULE | Refills: 3 | Status: SHIPPED | OUTPATIENT
Start: 2025-03-07

## 2025-03-07 NOTE — TELEPHONE ENCOUNTER
----- Message from Giana sent at 3/7/2025  9:50 AM CST -----  Contact: Cassie   Message from Giana sent at 3/7/2025 10:38 AM CST -----Contact: Agueda is calling in regards to the refill needs to be [facility] to [facility]..Type:  RX Refill RequestWho Called: ArmindaRefill or New Rx:refillRX Name and Strength:dofetilide (TIKOSYN) 500 MCG CapHow is the patient currently taking it? (ex. 1XDay):Route: Take 125 mcg by mouth every 12 (twelve) hourIs this a 30 day or 90 day RX:90 day supply with 3 refillsPreferred Pharmacy with phone number:[facility] [address] [facility] , [place] [phone] fax: [phone] or Mail Order:mailOrdering Provider:RADHA EVANS STAFFWould the patient rather a call back or a response via [facility]? Call backBNew [facility] Call Back Number:.[phone] Additional Information: [email or web address]

## 2025-03-10 RX ORDER — FUROSEMIDE 20 MG/1
20 TABLET ORAL 2 TIMES DAILY
Qty: 180 TABLET | Refills: 3 | Status: SHIPPED | OUTPATIENT
Start: 2025-03-10

## 2025-03-10 NOTE — TELEPHONE ENCOUNTER
----- Message from Donita sent at 3/10/2025 12:11 PM CDT -----  Regarding: Call Back  Contact: Patient  Type:  Patient Returning CallWho Called:Arturo Left Message for Patient: patient Does the patient know what this is regarding?:medication Would the patient rather a call back or a response via [facility]?  Call back Best Call Back Number: [phone] (home) [phone] (work)Additional Information: The caller indicated that she left a message on Friday for her dofetilide (TIKOSYN) 500 MCG Cap, furosemide (LASIX) 20 MG tablet and potassium citrate (UROCIT-K) 10 mEq (1,080 mg) TbSRThe dofetilide (TIKOSYN) 500 MCG Cap this should be sent to [facility] - [id number] [facility]  [facility] [address]Fgehk: [phone] Fax: [phone] caller is needing to know if the medication has been sent to [facility].Thanks,DINO

## 2025-03-10 NOTE — TELEPHONE ENCOUNTER
Attempted unsuccessfully to reach patient. Left voicemail for patient to call back to discuss  medication refills

## 2025-03-10 NOTE — TELEPHONE ENCOUNTER
----- Message from Giana sent at 3/7/2025  9:54 AM CST -----  Contact: Cassie  .Type:  RX Refill RequestWho Called: Veronica or New Rx:refillRX Name and Strength:furosemide (LASIX) 20 MG tabletHow is the patient currently taking it? (ex. 1XDay):Route: Take 20 mg by mouth 2 (two) times dailyIs this a 30 day or 90 day RX:30Preferred Pharmacy with phone number:.[id number] - [place], [place] - [address] AT [facility] [address] [facility] [address] [address]Pwsmd: [phone] Fax: [phone] or Mail Order:localOrdering Provider:RADHA EVANS STAFFWould the patient rather a call back or a response via [facility]? Call [facility] Call Back Number:.[phone] Additional Information:  Also a refill on potassium citrate (UROCIT-K) 10 mEq (1,080 mg) TbSRThanksCWJ

## 2025-03-19 ENCOUNTER — OFFICE VISIT (OUTPATIENT)
Dept: CARDIOLOGY | Facility: CLINIC | Age: 75
End: 2025-03-19
Payer: MEDICARE

## 2025-03-19 VITALS
BODY MASS INDEX: 37.47 KG/M2 | DIASTOLIC BLOOD PRESSURE: 70 MMHG | HEART RATE: 64 BPM | SYSTOLIC BLOOD PRESSURE: 140 MMHG | HEIGHT: 61 IN | OXYGEN SATURATION: 96 % | WEIGHT: 198.44 LBS

## 2025-03-19 DIAGNOSIS — I48.20 CHRONIC ATRIAL FIBRILLATION: Primary | ICD-10-CM

## 2025-03-19 DIAGNOSIS — E66.01 SEVERE OBESITY (BMI 35.0-39.9) WITH COMORBIDITY: ICD-10-CM

## 2025-03-19 DIAGNOSIS — G47.30 SLEEP APNEA WITH USE OF CONTINUOUS POSITIVE AIRWAY PRESSURE (CPAP): ICD-10-CM

## 2025-03-19 DIAGNOSIS — I27.20 PULMONARY HYPERTENSION: ICD-10-CM

## 2025-03-19 DIAGNOSIS — J43.1 PANLOBULAR EMPHYSEMA: ICD-10-CM

## 2025-03-19 DIAGNOSIS — I10 ESSENTIAL HYPERTENSION: ICD-10-CM

## 2025-03-19 PROCEDURE — 99214 OFFICE O/P EST MOD 30 MIN: CPT | Mod: PBBFAC,PO | Performed by: INTERNAL MEDICINE

## 2025-03-19 PROCEDURE — 99214 OFFICE O/P EST MOD 30 MIN: CPT | Mod: S$PBB,,, | Performed by: INTERNAL MEDICINE

## 2025-03-19 PROCEDURE — 99999 PR PBB SHADOW E&M-EST. PATIENT-LVL IV: CPT | Mod: PBBFAC,,, | Performed by: INTERNAL MEDICINE

## 2025-03-19 RX ORDER — DOFETILIDE 0.5 MG/1
500 CAPSULE ORAL DAILY
Start: 2025-03-19

## 2025-03-19 RX ORDER — HYDRALAZINE HYDROCHLORIDE 25 MG/1
25 TABLET, FILM COATED ORAL 2 TIMES DAILY PRN
Qty: 60 TABLET | Refills: 11 | Status: SHIPPED | OUTPATIENT
Start: 2025-03-19 | End: 2026-03-19

## 2025-03-19 RX ORDER — POTASSIUM CITRATE 1080 MG/1
10 TABLET, EXTENDED RELEASE ORAL DAILY PRN
Qty: 30 TABLET | Refills: 11 | Status: SHIPPED | OUTPATIENT
Start: 2025-03-19

## 2025-03-19 NOTE — PROGRESS NOTES
Subjective:   Patient ID:  Cassie Srivastava is a 75 y.o. female who presents for follow-up of No chief complaint on file.  Last clinic note 2-25:  Pt to establish care. Pt with hx of afib and cardioversion 4-5 years ago in [place].  Pt with recent palpitations last few nights, waking pt up. Last a few minutes  Patient denies CP, angina or anginal equivalent.  Pt states norvasc makes her dizzy  EKG sinus bradycardia  Norvasc held.    Today:  Pt with Er visit with high BP SBP 190s. But BP low at night  Pt with palpitations  Patient denies CP, angina or anginal equivalent.  Atrial Fibrillation  Presents for follow-up visit. Symptoms include dizziness and palpitations. Symptoms are negative for bradycardia, chest pain, hypotension, shortness of breath, syncope, tachycardia and weakness. The symptoms have been stable. There are no medication compliance problems.   Dizziness:   Chronicity:  New  Onset:  In the past 7 days  Progression since onset:  Waxing and waning  Frequency:  Every few days  Severity:  Mild  Duration:  Very brief  Dizziness characteristics:  Off-balance  Frequency of Spells:  Daily   Associated symptoms: palpitations.no weakness, no syncope and no chest pain.  Aggravated by:  Getting up  Treatments tried:  Rest  Improvements on treatment:  Mild      Review of Systems   Constitutional: Negative. Negative for weight gain.   HENT: Negative.     Eyes: Negative.    Cardiovascular:  Positive for palpitations. Negative for chest pain, leg swelling and syncope.   Respiratory: Negative.  Negative for shortness of breath.    Endocrine: Negative.    Hematologic/Lymphatic: Negative.    Skin: Negative.    Musculoskeletal:  Negative for muscle weakness.   Gastrointestinal: Negative.    Genitourinary: Negative.    Neurological:  Positive for dizziness. Negative for weakness.   Psychiatric/Behavioral: Negative.     Allergic/Immunologic: Negative.    All other systems reviewed and are negative.    Family  History   Problem Relation Name Age of Onset    Glaucoma Mother      Glaucoma Sister      Cataracts Sister      Diabetes Sister      Hypertension Sister      Glaucoma Brother      Cataracts Brother      Hypertension Brother      Stroke Brother       Past Medical History:   Diagnosis Date    A-fib     Acquired hypercoagulable state     Arthritis     COPD (chronic obstructive pulmonary disease)     Diastolic heart failure     Glaucoma     Graves disease     Heart failure, unspecified     Hypertension     Hypertensive heart disease with chronic diastolic congestive heart failure     Major depression in remission     MELITON on CPAP     Pulmonary hypertension     Strabismus      Social History[1]  Medications Ordered Prior to Encounter[2]  Review of patient's allergies indicates:   Allergen Reactions    Codeine Nausea Only and Other (See Comments)       Objective:     Physical Exam  Vitals and nursing note reviewed.   Constitutional:       Appearance: She is well-developed.   HENT:      Head: Normocephalic and atraumatic.   Eyes:      Conjunctiva/sclera: Conjunctivae normal.      Pupils: Pupils are equal, round, and reactive to light.   Cardiovascular:      Rate and Rhythm: Normal rate and regular rhythm.      Pulses: Intact distal pulses.      Heart sounds: Normal heart sounds.   Pulmonary:      Effort: Pulmonary effort is normal.      Breath sounds: Normal breath sounds.   Abdominal:      General: Bowel sounds are normal.      Palpations: Abdomen is soft.   Musculoskeletal:         General: Normal range of motion.      Cervical back: Normal range of motion and neck supple.   Skin:     General: Skin is warm and dry.   Neurological:      Mental Status: She is alert and oriented to person, place, and time.         Assessment:     1. Chronic atrial fibrillation    2. Essential hypertension    3. Pulmonary hypertension    4. Panlobular emphysema    5. Severe obesity (BMI 35.0-39.9) with comorbidity    6. Sleep apnea with use  of continuous positive airway pressure (CPAP)        Plan:     Chronic atrial fibrillation    Essential hypertension    Pulmonary hypertension    Panlobular emphysema    Severe obesity (BMI 35.0-39.9) with comorbidity    Sleep apnea with use of continuous positive airway pressure (CPAP)      Continue xarelto, tikosyn -  afib  Continue  lisinopril, hctz - HTN  BP diary  Awaiting cardiac monitor  Hydralazine prn       [1]   Social History  Socioeconomic History    Marital status:     Number of children: 2   Tobacco Use    Smoking status: Never     Passive exposure: Past    Smokeless tobacco: Never    Tobacco comments:     Father was a heavy smoker.    Substance and Sexual Activity    Alcohol use: Not Currently    Drug use: Not Currently     Social Drivers of Health     Financial Resource Strain: Medium Risk (2/14/2025)    Overall Financial Resource Strain (CARDIA)     Difficulty of Paying Living Expenses: Somewhat hard   Food Insecurity: No Food Insecurity (2/14/2025)    Hunger Vital Sign     Worried About Running Out of Food in the Last Year: Never true     Ran Out of Food in the Last Year: Never true   Transportation Needs: Unmet Transportation Needs (2/14/2025)    PRAPARE - Transportation     Lack of Transportation (Medical): Yes     Lack of Transportation (Non-Medical): Yes   Physical Activity: Insufficiently Active (2/14/2025)    Exercise Vital Sign     Days of Exercise per Week: 2 days     Minutes of Exercise per Session: 30 min   Stress: Stress Concern Present (2/14/2025)    [employer] [employer] - Occupational Stress Questionnaire     Feeling of Stress : To some extent   Housing Stability: Low Risk  (2/14/2025)    Housing Stability Vital Sign     Unable to Pay for Housing in the Last Year: No     Homeless in the Last Year: No   [2]   Current Outpatient Medications on File Prior to Visit   Medication Sig Dispense Refill    acetaminophen (TYLENOL) 325 MG tablet Take 500 mg by mouth.       ADVAIR DISKUS 500-50 mcg/dose DsDv diskus inhaler Inhale 1 puff into the lungs 2 (two) times daily. 180 each 4    albuterol (VENTOLIN HFA) 90 mcg/actuation inhaler Inhale 2 puffs into the lungs every 4 (four) hours as needed for Wheezing. Rescue 1 g 3    albuterol-ipratropium (DUO-NEB) 2.5 mg-0.5 mg/3 mL nebulizer solution Take 3 mLs by nebulization every 6 (six) hours as needed for Wheezing. Rescue 1 each 6    amoxicillin-clavulanate 875-125mg (AUGMENTIN) 875-125 mg per tablet Take 1 tablet by mouth 2 (two) times daily. 20 tablet 0    ascorbic acid, vitamin C, 1,000 mg TbSR Take 2,000 mg by mouth.      atorvastatin (LIPITOR) 10 MG tablet Take 10 mg by mouth once daily.      benzonatate (TESSALON) 100 MG capsule Take 200 mg by mouth.      bimatoprost (LUMIGAN) 0.01 % Drop Place 1 drop into both eyes every evening. 7.5 mL 4    cetirizine (ZYRTEC) 10 MG tablet Take 10 mg by mouth.      cholecalciferol, vitamin D3, 125 mcg (5,000 unit) Tab Take by mouth once daily.      docusate sodium (COLACE) 100 MG capsule Take 100 mg by mouth.      dofetilide (TIKOSYN) 500 MCG Cap Take 1 capsule (500 mcg total) by mouth every 12 (twelve) hours. 180 capsule 3    dupilumab (DUPIXENT) 300 mg/2 mL Syrg Inject 2 mLs (300 mg total) into the skin every 28 days. 2 mL 11    fluticasone (FLONASE) 50 mcg/actuation nasal spray       fluticasone (VERAMYST) 27.5 mcg/actuation nasal spray 2 sprays by Nasal route once daily.      fluticasone-salmeterol diskus inhaler 250-50 mcg Inhale 1 puff into the lungs 2 (two) times daily. Controller 720 each 0    furosemide (LASIX) 20 MG tablet Take 1 tablet (20 mg total) by mouth 2 (two) times daily. 180 tablet 3    hydroCHLOROthiazide 12.5 MG Tab Take 1 tablet (12.5 mg total) by mouth once daily. 30 tablet 11    levalbuterol (XOPENEX HFA) 45 mcg/actuation inhaler Inhale 1-2 puffs into the lungs every 8 (eight) hours as needed for Wheezing. Rescue 15 g 2    levalbuterol (XOPENEX) 0.63 mg/3 mL nebulizer  solution Take 3 mLs (0.63 mg total) by nebulization 2 (two) times daily as needed for Wheezing. 1 each 6    levothyroxine (SYNTHROID) 100 MCG tablet Take 1 tablet (100 mcg total) by mouth before breakfast. 30 tablet 11    lisinopriL (PRINIVIL,ZESTRIL) 20 MG tablet Take 20 mg by mouth 2 (two) times a day.      magnesium oxide (MAG-OX) 400 mg (241.3 mg magnesium) tablet Take 1 tablet by mouth every morning.      omeprazole (PRILOSEC) 10 MG capsule Take 10 mg by mouth as needed.      potassium citrate (UROCIT-K) 10 mEq (1,080 mg) TbSR       rivaroxaban (XARELTO) 20 mg Tab Take 1 tablet (20 mg total) by mouth daily with dinner or evening meal. 90 tablet 3    tezepelumab-ekko (TEZSPIRE) 210 mg/1.91 mL (110 mg/mL) PnIj Inject into the skin.      zinc gluconate 50 mg tablet Take 1 tablet by mouth every morning.       No current facility-administered medications on file prior to visit.

## 2025-03-24 ENCOUNTER — HOSPITAL ENCOUNTER (OUTPATIENT)
Dept: CARDIOLOGY | Facility: HOSPITAL | Age: 75
Discharge: HOME | End: 2025-03-24
Attending: INTERNAL MEDICINE
Payer: MEDICARE

## 2025-03-24 DIAGNOSIS — I48.20 CHRONIC ATRIAL FIBRILLATION: ICD-10-CM

## 2025-04-28 RX ORDER — FLUTICASONE PROPIONATE AND SALMETEROL 250; 50 UG/1; UG/1
1 POWDER RESPIRATORY (INHALATION) 2 TIMES DAILY
Qty: 720 EACH | Refills: 0 | Status: SHIPPED | OUTPATIENT
Start: 2025-04-28 | End: 2026-04-28

## 2025-04-28 RX ORDER — FLUTICASONE PROPIONATE AND SALMETEROL 500; 50 UG/1; UG/1
1 POWDER RESPIRATORY (INHALATION) 2 TIMES DAILY
Qty: 180 EACH | Refills: 4 | Status: SHIPPED | OUTPATIENT
Start: 2025-04-28

## 2025-04-30 ENCOUNTER — LAB VISIT (OUTPATIENT)
Dept: LAB | Facility: HOSPITAL | Age: 75
End: 2025-04-30
Attending: INTERNAL MEDICINE
Payer: MEDICARE

## 2025-04-30 ENCOUNTER — OFFICE VISIT (OUTPATIENT)
Dept: CARDIOLOGY | Facility: CLINIC | Age: 75
End: 2025-04-30
Payer: MEDICARE

## 2025-04-30 VITALS
BODY MASS INDEX: 38.26 KG/M2 | WEIGHT: 202.63 LBS | HEIGHT: 61 IN | SYSTOLIC BLOOD PRESSURE: 160 MMHG | DIASTOLIC BLOOD PRESSURE: 84 MMHG | OXYGEN SATURATION: 98 % | HEART RATE: 49 BPM

## 2025-04-30 DIAGNOSIS — I10 ESSENTIAL HYPERTENSION: ICD-10-CM

## 2025-04-30 DIAGNOSIS — I48.20 CHRONIC ATRIAL FIBRILLATION: ICD-10-CM

## 2025-04-30 DIAGNOSIS — J43.1 PANLOBULAR EMPHYSEMA: Primary | ICD-10-CM

## 2025-04-30 DIAGNOSIS — E66.01 SEVERE OBESITY (BMI 35.0-39.9) WITH COMORBIDITY: ICD-10-CM

## 2025-04-30 DIAGNOSIS — I27.20 PULMONARY HYPERTENSION: ICD-10-CM

## 2025-04-30 DIAGNOSIS — G47.30 SLEEP APNEA WITH USE OF CONTINUOUS POSITIVE AIRWAY PRESSURE (CPAP): ICD-10-CM

## 2025-04-30 DIAGNOSIS — E89.0 POST-SURGICAL HYPOTHYROIDISM: ICD-10-CM

## 2025-04-30 LAB
T4 FREE SERPL-MCNC: 1.42 NG/DL (ref 0.71–1.51)
TSH SERPL-ACNC: 0.19 UIU/ML (ref 0.4–4)

## 2025-04-30 PROCEDURE — 84439 ASSAY OF FREE THYROXINE: CPT

## 2025-04-30 PROCEDURE — 99999 PR PBB SHADOW E&M-EST. PATIENT-LVL III: CPT | Mod: PBBFAC,,, | Performed by: INTERNAL MEDICINE

## 2025-04-30 PROCEDURE — 36415 COLL VENOUS BLD VENIPUNCTURE: CPT | Mod: PO

## 2025-04-30 PROCEDURE — 99213 OFFICE O/P EST LOW 20 MIN: CPT | Mod: PBBFAC,PO | Performed by: INTERNAL MEDICINE

## 2025-04-30 PROCEDURE — 99214 OFFICE O/P EST MOD 30 MIN: CPT | Mod: S$PBB,,, | Performed by: INTERNAL MEDICINE

## 2025-04-30 PROCEDURE — 84443 ASSAY THYROID STIM HORMONE: CPT

## 2025-04-30 NOTE — PROGRESS NOTES
Subjective:   Patient ID:  Cassie Srivastava is a 75 y.o. female who presents for follow-up of No chief complaint on file.  Cardiac monitor wnl  The minimum heart rate was 37 bpm; the maximum 103 bpm; the average 64 bpm.   No pauses  BP labile using prn hydralazine    Atrial Fibrillation  Presents for follow-up visit. Symptoms include dizziness and palpitations. Symptoms are negative for bradycardia, chest pain, hypotension, shortness of breath, syncope, tachycardia and weakness. The symptoms have been stable. There are no medication compliance problems.   Dizziness:   Chronicity:  New  Onset:  In the past 7 days  Progression since onset:  Waxing and waning  Frequency:  Every few days  Severity:  Mild  Duration:  Very brief  Dizziness characteristics:  Off-balance  Frequency of Spells:  Daily   Associated symptoms: palpitations.no weakness, no syncope and no chest pain.  Aggravated by:  Getting up  Treatments tried:  Rest  Improvements on treatment:  Mild  Hypertension  This is a chronic problem. The current episode started more than 1 year ago. The problem has been gradually improving since onset. The problem is controlled. Pertinent negatives include no chest pain, palpitations or shortness of breath. Past treatments include ace (-)  and diuretic. The current treatment provides moderate improvement. There are no compliance problems.       Review of Systems   Constitutional: Negative. Negative for weight gain.   HENT: Negative.     Eyes: Negative.    Cardiovascular:  Positive for palpitations. Negative for chest pain, leg swelling and syncope.   Respiratory: Negative.  Negative for shortness of breath.    Endocrine: Negative.    Hematologic/Lymphatic: Negative.    Skin: Negative.    Musculoskeletal:  Negative for muscle weakness.   Gastrointestinal: Negative.    Genitourinary: Negative.    Neurological:  Positive for dizziness. Negative for weakness.   Psychiatric/Behavioral: Negative.      Allergic/Immunologic: Negative.    All other systems reviewed and are negative.    Family History   Problem Relation Name Age of Onset    Glaucoma Mother      Glaucoma Sister      Cataracts Sister      Diabetes Sister      Hypertension Sister      Glaucoma Brother      Cataracts Brother      Hypertension Brother      Stroke Brother       Past Medical History:   Diagnosis Date    A-fib     Acquired hypercoagulable state     Arthritis     COPD (chronic obstructive pulmonary disease)     Diastolic heart failure     Glaucoma     Graves disease     Heart failure, unspecified     Hypertension     Hypertensive heart disease with chronic diastolic congestive heart failure     Major depression in remission     MELITON on CPAP     Pulmonary hypertension     Strabismus      Social History[1]  Medications Ordered Prior to Encounter[2]  Review of patient's allergies indicates:   Allergen Reactions    Codeine Nausea Only and Other (See Comments)       Objective:     Physical Exam  Vitals and nursing note reviewed.   Constitutional:       Appearance: She is well-developed.   HENT:      Head: Normocephalic and atraumatic.   Eyes:      Conjunctiva/sclera: Conjunctivae normal.      Pupils: Pupils are equal, round, and reactive to light.   Cardiovascular:      Rate and Rhythm: Normal rate and regular rhythm.      Pulses: Intact distal pulses.      Heart sounds: Normal heart sounds.   Pulmonary:      Effort: Pulmonary effort is normal.      Breath sounds: Normal breath sounds.   Abdominal:      General: Bowel sounds are normal.      Palpations: Abdomen is soft.   Musculoskeletal:         General: Normal range of motion.      Cervical back: Normal range of motion and neck supple.   Skin:     General: Skin is warm and dry.   Neurological:      Mental Status: She is alert and oriented to person, place, and time.         Assessment:     1. Panlobular emphysema    2. Pulmonary hypertension    3. Essential hypertension    4. Chronic atrial  fibrillation    5. Severe obesity (BMI 35.0-39.9) with comorbidity    6. Sleep apnea with use of continuous positive airway pressure (CPAP)        Plan:     Panlobular emphysema    Pulmonary hypertension    Essential hypertension    Chronic atrial fibrillation    Severe obesity (BMI 35.0-39.9) with comorbidity    Sleep apnea with use of continuous positive airway pressure (CPAP)      Continue xarelto, tikosyn -  afib  Continue  lisinopril, hctz - HTN  BP diary  Hydralazine prn         [1]   Social History  Socioeconomic History    Marital status:     Number of children: 2   Tobacco Use    Smoking status: Never     Passive exposure: Past    Smokeless tobacco: Never    Tobacco comments:     Father was a heavy smoker.    Substance and Sexual Activity    Alcohol use: Not Currently    Drug use: Not Currently     Social Drivers of Health     Financial Resource Strain: Medium Risk (2/14/2025)    Overall Financial Resource Strain (CARDIA)     Difficulty of Paying Living Expenses: Somewhat hard   Food Insecurity: No Food Insecurity (2/14/2025)    Hunger Vital Sign     Worried About Running Out of Food in the Last Year: Never true     Ran Out of Food in the Last Year: Never true   Transportation Needs: Unmet Transportation Needs (2/14/2025)    PRAPARE - Transportation     Lack of Transportation (Medical): Yes     Lack of Transportation (Non-Medical): Yes   Physical Activity: Insufficiently Active (2/14/2025)    Exercise Vital Sign     Days of Exercise per Week: 2 days     Minutes of Exercise per Session: 30 min   Stress: Stress Concern Present (2/14/2025)    [employer] - Occupational Stress Questionnaire     Feeling of Stress : To some extent   Housing Stability: Low Risk  (2/14/2025)    Housing Stability Vital Sign     Unable to Pay for Housing in the Last Year: No     Homeless in the Last Year: No   [2]   Current Outpatient Medications on File Prior to Visit   Medication Sig Dispense  Refill    acetaminophen (TYLENOL) 325 MG tablet Take 500 mg by mouth.      albuterol (VENTOLIN HFA) 90 mcg/actuation inhaler Inhale 2 puffs into the lungs every 4 (four) hours as needed for Wheezing. Rescue 1 g 3    albuterol-ipratropium (DUO-NEB) 2.5 mg-0.5 mg/3 mL nebulizer solution Take 3 mLs by nebulization every 6 (six) hours as needed for Wheezing. Rescue 1 each 6    ascorbic acid, vitamin C, 1,000 mg TbSR Take 2,000 mg by mouth.      atorvastatin (LIPITOR) 10 MG tablet Take 10 mg by mouth once daily.      benzonatate (TESSALON) 100 MG capsule Take 200 mg by mouth.      bimatoprost (LUMIGAN) 0.01 % Drop Place 1 drop into both eyes every evening. 7.5 mL 4    cetirizine (ZYRTEC) 10 MG tablet Take 10 mg by mouth.      cholecalciferol, vitamin D3, 125 mcg (5,000 unit) Tab Take by mouth once daily.      docusate sodium (COLACE) 100 MG capsule Take 100 mg by mouth.      dofetilide (TIKOSYN) 500 MCG Cap Take 1 capsule (500 mcg total) by mouth once daily.      dupilumab (DUPIXENT) 300 mg/2 mL Syrg Inject 2 mLs (300 mg total) into the skin every 28 days. 2 mL 11    fluticasone (FLONASE) 50 mcg/actuation nasal spray       fluticasone (VERAMYST) 27.5 mcg/actuation nasal spray 2 sprays by Nasal route once daily.      fluticasone-salmeterol diskus inhaler 250-50 mcg Inhale 1 puff into the lungs 2 (two) times daily. Controller 720 each 0    fluticasone-salmeterol diskus inhaler 500-50 mcg Inhale 1 puff into the lungs 2 (two) times daily. 180 each 4    furosemide (LASIX) 20 MG tablet Take 1 tablet (20 mg total) by mouth 2 (two) times daily. 180 tablet 3    hydrALAZINE (APRESOLINE) 25 MG tablet Take 1 tablet (25 mg total) by mouth 2 (two) times daily as needed (SBP > 160). 60 tablet 11    hydroCHLOROthiazide 12.5 MG Tab Take 1 tablet (12.5 mg total) by mouth once daily. 30 tablet 11    levalbuterol (XOPENEX HFA) 45 mcg/actuation inhaler Inhale 1-2 puffs into the lungs every 8 (eight) hours as needed for Wheezing. Rescue 15 g  2    levalbuterol (XOPENEX) 0.63 mg/3 mL nebulizer solution Take 3 mLs (0.63 mg total) by nebulization 2 (two) times daily as needed for Wheezing. 1 each 6    levothyroxine (SYNTHROID) 100 MCG tablet Take 1 tablet (100 mcg total) by mouth before breakfast. 30 tablet 11    lisinopriL (PRINIVIL,ZESTRIL) 20 MG tablet Take 20 mg by mouth 2 (two) times a day.      magnesium oxide (MAG-OX) 400 mg (241.3 mg magnesium) tablet Take 1 tablet by mouth every morning.      omeprazole (PRILOSEC) 10 MG capsule Take 10 mg by mouth as needed.      potassium citrate (UROCIT-K) 10 mEq (1,080 mg) TbSR Take 1 tablet (10 mEq total) by mouth daily as needed (when taking lasix). 30 tablet 11    rivaroxaban (XARELTO) 20 mg Tab Take 1 tablet (20 mg total) by mouth daily with dinner or evening meal. 90 tablet 3    zinc gluconate 50 mg tablet Take 1 tablet by mouth every morning.       No current facility-administered medications on file prior to visit.

## 2025-05-01 ENCOUNTER — RESULTS FOLLOW-UP (OUTPATIENT)
Dept: INTERNAL MEDICINE | Facility: CLINIC | Age: 75
End: 2025-05-01

## 2025-05-01 ENCOUNTER — TELEPHONE (OUTPATIENT)
Dept: INTERNAL MEDICINE | Facility: CLINIC | Age: 75
End: 2025-05-01
Payer: MEDICARE

## 2025-05-01 DIAGNOSIS — E89.0 POST-SURGICAL HYPOTHYROIDISM: Primary | ICD-10-CM

## 2025-05-01 NOTE — TELEPHONE ENCOUNTER
----- Message from Silva sent at 5/1/2025  3:40 PM CDT -----  Contact: Cassie  Type:  Patient Requesting a call back Who Called:Cassie What is the call back request regarding?:would like to speak to MD or nurse about her results, she states that something came back abnormal Would the patient rather a call back or a response via [facility] Call Back Number:[phone] Additional Information:

## 2025-05-02 ENCOUNTER — TELEPHONE (OUTPATIENT)
Dept: INTERNAL MEDICINE | Facility: CLINIC | Age: 75
End: 2025-05-02
Payer: MEDICARE

## 2025-05-02 NOTE — TELEPHONE ENCOUNTER
Spoke with pt , informed her provider left for the day . Pt logged on to virtual at 4:23 pm and appt was for 3:40 pm. Pt verbalized all understanding . Pt was scheduled Monday 5/5/25 for 1:20 pm. Informed pt she can log on 10 min before appt time. If provider does not get on immediately , please stay logged on. Pt stated she is fatigue and may not make it through the weekend. Informed pt that it is always recommended to go to the nearest ER if symptoms worsen. Pt verbalized all understanding.

## 2025-05-06 ENCOUNTER — OFFICE VISIT (OUTPATIENT)
Dept: OPHTHALMOLOGY | Facility: CLINIC | Age: 75
End: 2025-05-06
Payer: MEDICARE

## 2025-05-06 DIAGNOSIS — H50.112 EXOTROPIA OF LEFT EYE: ICD-10-CM

## 2025-05-06 DIAGNOSIS — H57.89 THYROID EYE DISEASE: Primary | ICD-10-CM

## 2025-05-06 DIAGNOSIS — E05.00 GRAVES DISEASE: ICD-10-CM

## 2025-05-06 DIAGNOSIS — H50.22 HYPOTROPIA OF LEFT EYE: ICD-10-CM

## 2025-05-06 DIAGNOSIS — E07.9 THYROID EYE DISEASE: Primary | ICD-10-CM

## 2025-05-06 DIAGNOSIS — Z96.1 PSEUDOPHAKIA OF BOTH EYES: ICD-10-CM

## 2025-05-06 PROCEDURE — 92004 COMPRE OPH EXAM NEW PT 1/>: CPT | Mod: S$PBB,,, | Performed by: OPHTHALMOLOGY

## 2025-05-06 PROCEDURE — 99212 OFFICE O/P EST SF 10 MIN: CPT | Mod: PBBFAC,PO | Performed by: OPHTHALMOLOGY

## 2025-05-06 PROCEDURE — 99999 PR PBB SHADOW E&M-EST. PATIENT-LVL II: CPT | Mod: PBBFAC,,, | Performed by: OPHTHALMOLOGY

## 2025-05-06 NOTE — PROGRESS NOTES
HPI     GURU FLETCHER      Additional comments: Pt states vision OS depth perception is not good. Pt   states she is not seeing well.            Comments    Pt new to [facility] today   States she has double VA in OS has Stab in OS as well no longer drives   because of the double vision in the left eye     OU itch really bad   Vision better in right eye   Denies flashes and flaoters           Last edited by José Luis Rivera on 5/6/2025  3:30 PM.            Assessment /Plan     For exam results, see Encounter Report.    Thyroid eye disease  Graves disease  No APD, color vision full; no evidence of optic neuropathy. No exposure keratopathy. Recommend ATs prn for any dry eye symptoms.    Refer to Dr. Gooden      Hypotropia of left eye  Exotropia of left eye  See above      Pseudophakia of both eyes  Condition stable, no therapeutic intervention necessary at this time. Will continue to monitor.      Return to clinic as needed

## 2025-05-12 DIAGNOSIS — J45.50 SEVERE PERSISTENT ASTHMA WITHOUT COMPLICATION: Primary | ICD-10-CM

## 2025-05-12 NOTE — TELEPHONE ENCOUNTER
----- Message from Summer sent at 5/12/2025  1:12 PM CDT -----  Contact: Cassie  Type:  RX Refill RequestWho Called:  Pt Refill or New Rx: New refill RX Name and Strength: fluticasone (FLONASE) 50 mcg/actuation nasal spray How is the patient currently taking it? (ex. 1XDay): as neededIs this a 30 day or 90 day RX: as need Preferred Pharmacy with phone number:[id number] - [place] [address] [place] [address] [address] [address]Fkefa: [phone] Fax: [phone] or Mail Order: local Ordering Provider: Jessica Cruz Would the patient rather a call back or a response via [facility]? [facility] back [facility] Call Back Number: [phone] Information: PLEASE call pt. I not sure what she is asking for. Pt is expecting a call back Type:  RX Refill RequestWho Called:  Pt Refill or New Rx: New refill RX Name and Strength: cetirizine (ZYRTEC) 10 MG tablet How is the patient currently taking it? (ex. 1XDay): as neededIs this a 30 day or 90 day RX: as need [id number] [place] [address] [address] [facility] & [address] [address]Enrsx: [phone] Fax: [phone] or Mail Order: local Ordering Provider: Jessica Cruz Would the patient rather a call back or a response via [facility]? [facility] back [facility] Call Back Number: [phone] Information:

## 2025-05-13 RX ORDER — FLUTICASONE PROPIONATE 50 MCG
2 SPRAY, SUSPENSION (ML) NASAL DAILY
Qty: 16 G | Refills: 5 | Status: SHIPPED | OUTPATIENT
Start: 2025-05-13

## 2025-05-13 RX ORDER — CETIRIZINE HYDROCHLORIDE 10 MG/1
10 TABLET ORAL DAILY
Qty: 30 TABLET | Refills: 1 | Status: SHIPPED | OUTPATIENT
Start: 2025-05-13

## 2025-05-28 ENCOUNTER — PATIENT MESSAGE (OUTPATIENT)
Dept: PULMONOLOGY | Facility: CLINIC | Age: 75
End: 2025-05-28
Payer: MEDICARE

## 2025-06-03 DIAGNOSIS — J45.50 SEVERE PERSISTENT ASTHMA WITHOUT COMPLICATION: Primary | ICD-10-CM

## 2025-06-03 RX ORDER — FLUTICASONE PROPIONATE AND SALMETEROL 500; 50 UG/1; UG/1
1 POWDER RESPIRATORY (INHALATION) 2 TIMES DAILY
Qty: 180 EACH | Refills: 4 | Status: SHIPPED | OUTPATIENT
Start: 2025-06-03

## 2025-06-11 ENCOUNTER — TELEPHONE (OUTPATIENT)
Dept: PULMONOLOGY | Facility: CLINIC | Age: 75
End: 2025-06-11
Payer: MEDICARE

## 2025-06-11 NOTE — TELEPHONE ENCOUNTER
Spoke with Jane with [facility]. PA for fluticasone-salmeterol diskus inhaler 500-50 mcg was denied, she will fax over paperwork for appeal.

## 2025-06-11 NOTE — TELEPHONE ENCOUNTER
Copied from CRM #[id number]. Topic: Medications - Pharmacy  >> Jun 11, 2025  2:46 PM Leda wrote:  ..Type:  Pharmacy Calling to Clarify an RX    Name of Caller:Chris   Pharmacy Name:   Prescription Name: fluticasone-salmeterol diskus inhaler 500-50 mcg  What do they need to clarify?:diagnosis   [facility] Call Back Number:[phone] or [phone]  Additional Information: Chris is asking for an return call in reference  to needing diagnosis for pt to prescribe medication.

## 2025-06-12 ENCOUNTER — PATIENT MESSAGE (OUTPATIENT)
Dept: ELECTROPHYSIOLOGY | Facility: CLINIC | Age: 75
End: 2025-06-12
Payer: MEDICARE

## 2025-06-12 DIAGNOSIS — I48.20 CHRONIC ATRIAL FIBRILLATION: Primary | ICD-10-CM

## 2025-06-25 ENCOUNTER — PATIENT MESSAGE (OUTPATIENT)
Dept: INTERNAL MEDICINE | Facility: CLINIC | Age: 75
End: 2025-06-25
Payer: MEDICARE

## 2025-07-02 ENCOUNTER — PATIENT MESSAGE (OUTPATIENT)
Dept: INTERNAL MEDICINE | Facility: CLINIC | Age: 75
End: 2025-07-02
Payer: MEDICARE

## 2025-07-03 ENCOUNTER — PATIENT MESSAGE (OUTPATIENT)
Dept: CARDIOLOGY | Facility: CLINIC | Age: 75
End: 2025-07-03
Payer: MEDICARE

## 2025-07-11 ENCOUNTER — TELEPHONE (OUTPATIENT)
Dept: CARDIOLOGY | Facility: CLINIC | Age: 75
End: 2025-07-11
Payer: MEDICARE

## 2025-07-11 DIAGNOSIS — I10 ESSENTIAL HYPERTENSION: Primary | ICD-10-CM

## 2025-07-11 NOTE — TELEPHONE ENCOUNTER
Copied from CRM #[id number]. Topic: Appointments - Appointment Confirmation  >> Jul 11, 2025  2:05 PM Scarlet wrote:  Type:  Sooner Apoointment Request    Caller is requesting a sooner appointment.  Caller declined first available appointment listed below.  Caller will not accept being placed on the waitlist and is requesting a message be sent to doctor.  Name of Caller:Cassie  When is the first available appointment?no slots available  Symptoms:afib follow up  Would the patient rather a call back or a response via [facility]? Please call back  [facility] Call Back Number:[phone]  Additional Information:

## 2025-07-18 ENCOUNTER — TELEPHONE (OUTPATIENT)
Dept: PULMONOLOGY | Facility: CLINIC | Age: 75
End: 2025-07-18
Payer: MEDICARE

## 2025-07-18 NOTE — TELEPHONE ENCOUNTER
Spoke with pt. Needs alternative rx called in. Insurance will not cover fluticasone-salmeterol inhaler or ventolin. Pt would like covered alternative called in:  Symbicort, Breo ellipta or Arnuity. Requesting proventil in place of ventolin, per formulary. Please advise.      Copied from CRM #[id number]. Topic: Medications - Medication Authorization  >> Jul 18, 2025  3:28 PM Jadyn wrote:  .Patient is calling to speak with the nurse regarding authorization  . Reports would like someone to call pt due to pt has been calling for days . Please give patient a call back at .[phone]

## 2025-07-23 RX ORDER — FLUTICASONE FUROATE AND VILANTEROL 200; 25 UG/1; UG/1
1 POWDER RESPIRATORY (INHALATION) DAILY
Qty: 1 EACH | Refills: 5 | Status: SHIPPED | OUTPATIENT
Start: 2025-07-23 | End: 2025-07-23

## 2025-07-23 RX ORDER — FLUTICASONE FUROATE AND VILANTEROL 200; 25 UG/1; UG/1
1 POWDER RESPIRATORY (INHALATION) DAILY
Qty: 1 EACH | Refills: 5 | Status: SHIPPED | OUTPATIENT
Start: 2025-07-23

## 2025-07-31 NOTE — PROGRESS NOTES
Subjective:   Patient ID:  Cassie Srivastava is a 75 y.o. female who presents for evaluation of Establish Care      HPI    Cassie Srivastava is a 75 year old female who presents to Arrhythmia clinic for AF management. Her current medical conditions include pAF on Tikosyn/xarelto, HTN, Pulm HTN, MELITON on CPAP, CHF.     Extended holter without any documented AF, HRs well controlled.     Has persistent dizziness, more when she changes position.     Trying to do better with sodium restrictions.   Trying to be more consistent with chair exercises.     Denies chest pain or anginal equivalents.  NO leg swelling or claudications. No recent falls, syncope or near syncopal events. Reports compliance with medications and dietary restrictions. NO CNS complaints to suggest TIA or CVA today. No signs of abnormal bleeding on xarelto.     Past Medical History:   Diagnosis Date    A-fib     Acquired hypercoagulable state     Arthritis     COPD (chronic obstructive pulmonary disease)     Diastolic heart failure     Glaucoma     Graves disease     Heart failure, unspecified     Hypertension     Hypertensive heart disease with chronic diastolic congestive heart failure     Major depression in remission     MELITON on CPAP     Pulmonary hypertension     Strabismus        Past Surgical History:   Procedure Laterality Date    BUNIONECTOMY Bilateral     Bunion Correction    CATARACT EXTRACTION      CATARACT EXTRACTION  2016    HYSTERECTOMY      Particail    TOTAL KNEE REPLACEMENT USING COMPUTER NAVIGATION Right        Social History[1]    Family History   Problem Relation Name Age of Onset    Glaucoma Mother      Glaucoma Sister      Cataracts Sister      Diabetes Sister      Hypertension Sister      Glaucoma Brother      Cataracts Brother      Hypertension Brother      Stroke Brother         Wt Readings from Last 3 Encounters:   08/01/25 93 kg (205 lb 0.4 oz)   04/30/25 91.9 kg (202 lb 9.6 oz)   03/19/25 90 kg (198 lb 6.6 oz)      Temp Readings from Last 3 Encounters:   01/09/25 98.7 °F (37.1 °C) (Tympanic)   12/17/24 98.1 °F (36.7 °C) (Tympanic)   11/21/24 97.4 °F (36.3 °C) (Tympanic)     BP Readings from Last 3 Encounters:   08/01/25 (!) 142/70   04/30/25 (!) 160/84   03/19/25 (!) 140/70     Pulse Readings from Last 3 Encounters:   08/01/25 (!) 57   04/30/25 (!) 49   03/19/25 64       Medications Ordered Prior to Encounter[2]    Cardiac Monitor - 3-15 Day Adult (Cupid Only)  Result Date: 4/7/2025  The patient was monitored for a total of 6d, underlying rhythm is Sinus.  The minimum heart rate was 37 bpm; the maximum 103 bpm; the average 64   bpm.  0 % of Atrial fibrillation/Atrial flutter with longest episode of 0 ms.  The total burden of AV Block present was 0 % [Complete Heart Block: 0 %;   Advanced (High Grade):  0 %; 2nd Degree, Mobitz II: 0 %; 2nd Degree, Mobitz I: 0 %].  There were 0 pauses, the longest pause was 0 ms at --.  Total count of Ventricular Tachycardia (VT): 0 episode(s). Longest VT: 0 s   on --. Fastest Ventricular Run: -- bpm on --. Total  Count of Ventricular Episodes <100bpm: 0 episode(s). Longest Ventricular   Event <100bpm: 0 s on --  6 supraventricular episodes were found. Longest SVT Episode 7 beats,   Fastest  bpm  There were a total of 71650 PVCs with 2 morphologies and 40 couplets.   Overall PVC [place] at 5.82 %  There were a total of 0 Other Beats. There were 0 total number of paced   beats.  There were a total of 4044 PSVCs with 68 couplets. Overall PSVC [place] at  0.81 %  There is a total of 0 patient events          No results found for this or any previous visit.    No results found for this or any previous visit.        Results for orders placed or performed during the hospital encounter of 08/01/25   SCHEDULED EKG 12-LEAD (to Muse)    Collection Time: 08/01/25  9:55 AM   Result Value Ref Range    QRS Duration 78 ms    [facility] QTC Calculation 406 ms    Narrative    Test Reason : I10,    Vent. Rate :  " 57 BPM     Atrial Rate :  57 BPM     P-R Int : 186 ms          QRS Dur :  78 ms      QT Int : 418 ms       P-R-T Axes :  69  42  70 degrees    QTcB Int : 406 ms    Sinus bradycardia with marked sinus arrythmia  Otherwise normal ECG  When compared with ECG of 19-Feb-2025 11:03,  No significant change was found    Referred By: KEN MCCULLOUGH           Confirmed By:          Review of Systems   Constitutional: Positive for malaise/fatigue.   HENT:  Negative for hearing loss and hoarse voice.    Eyes:  Negative for blurred vision and visual disturbance.   Cardiovascular:  Negative for chest pain, claudication, dyspnea on exertion, irregular heartbeat, leg swelling, near-syncope, orthopnea, palpitations, paroxysmal nocturnal dyspnea and syncope.   Respiratory:  Negative for cough, hemoptysis, shortness of breath, sleep disturbances due to breathing, snoring and wheezing.    Endocrine: Negative for cold intolerance and heat intolerance.   Hematologic/Lymphatic: Does not bruise/bleed easily.   Skin:  Negative for color change, dry skin and nail changes.   Musculoskeletal:  Positive for arthritis and back pain. Negative for joint pain and myalgias.   Gastrointestinal:  Negative for bloating, abdominal pain, constipation, nausea and vomiting.   Genitourinary:  Negative for dysuria, flank pain, hematuria and hesitancy.   Neurological:  Positive for dizziness. Negative for headaches, light-headedness, loss of balance, numbness, paresthesias and weakness.   Psychiatric/Behavioral:  Negative for altered mental status.    Allergic/Immunologic: Negative for environmental allergies.         Objective:BP (!) 142/70 (BP Location: Left arm, Patient Position: Sitting)   Pulse (!) 57   Ht 5' 1" (1.549 m)   Wt 93 kg (205 lb 0.4 oz)   SpO2 96%   BMI 38.74 kg/m²      Physical Exam  Vitals and nursing note reviewed.   Constitutional:       General: She is not in acute distress.     Appearance: Normal appearance. She is well-developed. She " "is obese. She is not ill-appearing.   HENT:      Head: Normocephalic and atraumatic.      Nose: Nose normal.      Mouth/Throat:      Mouth: Mucous membranes are moist.   Eyes:      Pupils: Pupils are equal, round, and reactive to light.   Neck:      Thyroid: No thyromegaly.      Vascular: No JVD.      Trachea: No tracheal deviation.   Cardiovascular:      Rate and Rhythm: Regular rhythm. Bradycardia present.      Chest Wall: PMI is not displaced.      Pulses: Intact distal pulses.           Radial pulses are 2+ on the right side and 2+ on the left side.        Dorsalis pedis pulses are 2+ on the right side and 2+ on the left side.      Heart sounds: S1 normal and S2 normal. Heart sounds not distant. No murmur heard.     Comments: Remains in NSR  Pulmonary:      Effort: Pulmonary effort is normal. No respiratory distress.      Breath sounds: Normal breath sounds. No wheezing.   Abdominal:      General: Bowel sounds are normal. There is no distension.      Palpations: Abdomen is soft.      Tenderness: There is no abdominal tenderness.   Musculoskeletal:         General: No swelling. Normal range of motion.      Cervical back: Full passive range of motion without pain, normal range of motion and neck supple.      Right lower leg: No edema.      Left lower leg: No edema.      Right ankle: No swelling.      Left ankle: No swelling.   Skin:     General: Skin is warm and dry.      Capillary Refill: Capillary refill takes less than 2 seconds.      Nails: There is no clubbing.   Neurological:      General: No focal deficit present.      Mental Status: She is alert and oriented to person, place, and time.      Motor: No weakness.   Psychiatric:         Speech: Speech normal.         Behavior: Behavior normal.         Thought Content: Thought content normal.         Judgment: Judgment normal.         No results found for: "CHOL"  No results found for: "HDL"  No results found for: "LDLCALC"  No results found for: "TRIG"  No " "results found for: "CHOLHDL"    Chemistry        Component Value Date/Time     02/24/2025 1339    K 4.2 02/24/2025 1339     02/24/2025 1339    CO2 28 02/24/2025 1339    BUN 19 02/24/2025 1339    CREATININE 0.7 02/24/2025 1339    GLU 83 02/24/2025 1339        Component Value Date/Time    CALCIUM 9.2 02/24/2025 1339    ALKPHOS 170 (H) 02/09/2018 1519    AST 17 12/24/2024 1952    AST 26 02/09/2018 1519    ALT 15 12/24/2024 1952    ALT 19 02/09/2018 1519    BILITOT 0.8 12/24/2024 1952    BILITOT 1.1 (H) 02/09/2018 1519    ESTGFRAFRICA 66 12/09/2024 0701    ESTGFRAFRICA >60.0 02/09/2018 1519    EGFRNONAA >60.0 02/09/2018 1519          Lab Results   Component Value Date    TSH 0.186 (L) 04/30/2025     No results found for: "INR", "PROTIME"  Lab Results   Component Value Date    WBC 7.20 02/09/2018    HGB 13.2 02/09/2018    HCT 40.1 02/09/2018    MCV 93 02/09/2018     02/09/2018          Assessment:      1. Arthritis    2. Chronic atrial fibrillation    3. Visit for monitoring Tikosyn therapy    4. Anticoagulated    5. Essential hypertension    6. Dizziness on standing    7. Sleep apnea with use of continuous positive airway pressure (CPAP)    8. Severe obesity (BMI 35.0-39.9) with comorbidity        Plan:     Continue daily Tikosyn  Remains in NSR  Continue Xarelto for cardio-embolic protection  Encourage compression stocking use every day  Change positions slowly  Call if any issues with syncope  RTC PRN    Nicole May, FNP-C [facility]    Visit today included increased complexity associated with the care of the episodic problem chronic AF addressed and managing the longitudinal care of the patient due to the serious and/or complex managed problem(s) Discussed Tikosyn therapy and long term management.           [1]   Social History  Tobacco Use    Smoking status: Never     Passive exposure: Past    Smokeless tobacco: Never    Tobacco comments:     Father was a heavy smoker.    Substance Use " Topics    Alcohol use: Not Currently    Drug use: Not Currently   [2]   Current Outpatient Medications on File Prior to Visit   Medication Sig Dispense Refill    acetaminophen (TYLENOL) 325 MG tablet Take 500 mg by mouth.      albuterol (VENTOLIN HFA) 90 mcg/actuation inhaler Inhale 2 puffs into the lungs every 4 (four) hours as needed for Wheezing. Rescue 1 g 3    albuterol-ipratropium (DUO-NEB) 2.5 mg-0.5 mg/3 mL nebulizer solution Take 3 mLs by nebulization every 6 (six) hours as needed for Wheezing. Rescue 1 each 6    ascorbic acid, vitamin C, 1,000 mg TbSR Take 2,000 mg by mouth.      atorvastatin (LIPITOR) 10 MG tablet Take 10 mg by mouth once daily.      benzonatate (TESSALON) 100 MG capsule Take 200 mg by mouth.      bimatoprost (LUMIGAN) 0.01 % Drop Place 1 drop into both eyes every evening. 7.5 mL 4    cetirizine (ZYRTEC) 10 MG tablet Take 1 tablet (10 mg total) by mouth once daily. 30 tablet 1    cholecalciferol, vitamin D3, 125 mcg (5,000 unit) Tab Take by mouth once daily.      docusate sodium (COLACE) 100 MG capsule Take 100 mg by mouth.      dupilumab (DUPIXENT) 300 mg/2 mL Syrg Inject 2 mLs (300 mg total) into the skin every 28 days. 2 mL 11    fluticasone (VERAMYST) 27.5 mcg/actuation nasal spray 2 sprays by Nasal route once daily.      fluticasone furoate-vilanteroL (BREO ELLIPTA) 200-25 mcg/dose DsDv diskus inhaler Inhale 1 puff into the lungs once daily. Controller 1 each 5    fluticasone propionate (FLONASE) 50 mcg/actuation nasal spray 2 sprays (100 mcg total) by Each Nostril route once daily. 16 g 5    furosemide (LASIX) 20 MG tablet Take 1 tablet (20 mg total) by mouth 2 (two) times daily. 180 tablet 3    hydrALAZINE (APRESOLINE) 25 MG tablet Take 1 tablet (25 mg total) by mouth 2 (two) times daily as needed (SBP > 160). 60 tablet 11    hydroCHLOROthiazide 12.5 MG Tab Take 1 tablet (12.5 mg total) by mouth once daily. 30 tablet 11    levalbuterol (XOPENEX HFA) 45 mcg/actuation inhaler Inhale  1-2 puffs into the lungs every 8 (eight) hours as needed for Wheezing. Rescue 15 g 2    levalbuterol (XOPENEX) 0.63 mg/3 mL nebulizer solution Take 3 mLs (0.63 mg total) by nebulization 2 (two) times daily as needed for Wheezing. 1 each 6    levothyroxine (SYNTHROID) 100 MCG tablet Take 1 tablet (100 mcg total) by mouth before breakfast. 30 tablet 11    lisinopriL (PRINIVIL,ZESTRIL) 20 MG tablet Take 20 mg by mouth 2 (two) times a day.      magnesium oxide (MAG-OX) 400 mg (241.3 mg magnesium) tablet Take 1 tablet by mouth every morning.      omeprazole (PRILOSEC) 10 MG capsule Take 10 mg by mouth as needed.      potassium citrate (UROCIT-K) 10 mEq (1,080 mg) TbSR Take 1 tablet (10 mEq total) by mouth daily as needed (when taking lasix). 30 tablet 11    rivaroxaban (XARELTO) 20 mg Tab Take 1 tablet (20 mg total) by mouth daily with dinner or evening meal. 90 tablet 3    zinc gluconate 50 mg tablet Take 1 tablet by mouth every morning.      [DISCONTINUED] dofetilide (TIKOSYN) 500 MCG Cap Take 1 capsule (500 mcg total) by mouth once daily.       No current facility-administered medications on file prior to visit.

## 2025-08-01 ENCOUNTER — HOSPITAL ENCOUNTER (OUTPATIENT)
Dept: CARDIOLOGY | Facility: HOSPITAL | Age: 75
Discharge: HOME | End: 2025-08-01
Payer: MEDICARE

## 2025-08-01 ENCOUNTER — OFFICE VISIT (OUTPATIENT)
Dept: CARDIOLOGY | Facility: CLINIC | Age: 75
End: 2025-08-01
Payer: MEDICARE

## 2025-08-01 VITALS
BODY MASS INDEX: 38.71 KG/M2 | HEART RATE: 57 BPM | HEIGHT: 61 IN | DIASTOLIC BLOOD PRESSURE: 70 MMHG | SYSTOLIC BLOOD PRESSURE: 142 MMHG | WEIGHT: 205 LBS | OXYGEN SATURATION: 96 %

## 2025-08-01 DIAGNOSIS — Z79.899 VISIT FOR MONITORING TIKOSYN THERAPY: ICD-10-CM

## 2025-08-01 DIAGNOSIS — Z79.01 ANTICOAGULATED: ICD-10-CM

## 2025-08-01 DIAGNOSIS — J45.50 SEVERE PERSISTENT ASTHMA WITHOUT COMPLICATION: ICD-10-CM

## 2025-08-01 DIAGNOSIS — G47.30 SLEEP APNEA WITH USE OF CONTINUOUS POSITIVE AIRWAY PRESSURE (CPAP): ICD-10-CM

## 2025-08-01 DIAGNOSIS — M19.90 ARTHRITIS: Primary | ICD-10-CM

## 2025-08-01 DIAGNOSIS — E66.01 SEVERE OBESITY (BMI 35.0-39.9) WITH COMORBIDITY: ICD-10-CM

## 2025-08-01 DIAGNOSIS — Z51.81 VISIT FOR MONITORING TIKOSYN THERAPY: ICD-10-CM

## 2025-08-01 DIAGNOSIS — I48.20 CHRONIC ATRIAL FIBRILLATION: ICD-10-CM

## 2025-08-01 DIAGNOSIS — R42 DIZZINESS ON STANDING: ICD-10-CM

## 2025-08-01 DIAGNOSIS — I10 ESSENTIAL HYPERTENSION: ICD-10-CM

## 2025-08-01 LAB
OHS QRS DURATION: 78 MS
OHS QTC CALCULATION: 406 MS

## 2025-08-01 PROCEDURE — 99999 PR PBB SHADOW E&M-EST. PATIENT-LVL V: CPT | Mod: PBBFAC,,, | Performed by: NURSE PRACTITIONER

## 2025-08-01 PROCEDURE — 93005 ELECTROCARDIOGRAM TRACING: CPT

## 2025-08-01 PROCEDURE — 99215 OFFICE O/P EST HI 40 MIN: CPT | Mod: PBBFAC,25 | Performed by: NURSE PRACTITIONER

## 2025-08-01 PROCEDURE — 93010 ELECTROCARDIOGRAM REPORT: CPT | Mod: ,,, | Performed by: INTERNAL MEDICINE

## 2025-08-01 RX ORDER — DOFETILIDE 0.5 MG/1
500 CAPSULE ORAL DAILY
Qty: 90 CAPSULE | Refills: 3 | Status: SHIPPED | OUTPATIENT
Start: 2025-08-01

## 2025-08-04 PROBLEM — Z51.81 VISIT FOR MONITORING TIKOSYN THERAPY: Status: RESOLVED | Noted: 2025-08-01 | Resolved: 2025-08-04

## 2025-08-04 PROBLEM — Z79.899 VISIT FOR MONITORING TIKOSYN THERAPY: Status: RESOLVED | Noted: 2025-08-01 | Resolved: 2025-08-04

## 2025-08-05 ENCOUNTER — OFFICE VISIT (OUTPATIENT)
Dept: RHEUMATOLOGY | Facility: CLINIC | Age: 75
End: 2025-08-05
Payer: MEDICARE

## 2025-08-05 ENCOUNTER — HOSPITAL ENCOUNTER (OUTPATIENT)
Dept: RADIOLOGY | Facility: HOSPITAL | Age: 75
Discharge: HOME | End: 2025-08-05
Attending: INTERNAL MEDICINE
Payer: MEDICARE

## 2025-08-05 VITALS
SYSTOLIC BLOOD PRESSURE: 124 MMHG | HEART RATE: 52 BPM | WEIGHT: 203.94 LBS | HEIGHT: 61 IN | BODY MASS INDEX: 38.51 KG/M2 | DIASTOLIC BLOOD PRESSURE: 63 MMHG

## 2025-08-05 DIAGNOSIS — M19.90 ARTHRITIS: ICD-10-CM

## 2025-08-05 DIAGNOSIS — R74.8 HYPERCKEMIA: ICD-10-CM

## 2025-08-05 DIAGNOSIS — M15.0 PRIMARY OSTEOARTHRITIS INVOLVING MULTIPLE JOINTS: ICD-10-CM

## 2025-08-05 DIAGNOSIS — M54.50 CHRONIC LOW BACK PAIN, UNSPECIFIED BACK PAIN LATERALITY, UNSPECIFIED WHETHER SCIATICA PRESENT: ICD-10-CM

## 2025-08-05 DIAGNOSIS — Z90.89 S/P THYROIDECTOMY: ICD-10-CM

## 2025-08-05 DIAGNOSIS — Z51.81 MEDICATION MONITORING ENCOUNTER: ICD-10-CM

## 2025-08-05 DIAGNOSIS — M15.4 EROSIVE OSTEOARTHRITIS: Primary | ICD-10-CM

## 2025-08-05 DIAGNOSIS — G89.29 CHRONIC LOW BACK PAIN, UNSPECIFIED BACK PAIN LATERALITY, UNSPECIFIED WHETHER SCIATICA PRESENT: ICD-10-CM

## 2025-08-05 DIAGNOSIS — M15.4 EROSIVE OSTEOARTHRITIS: ICD-10-CM

## 2025-08-05 DIAGNOSIS — Z86.19 HISTORY OF TYPE C VIRAL HEPATITIS: ICD-10-CM

## 2025-08-05 DIAGNOSIS — Z71.89 COUNSELING ON HEALTH PROMOTION AND DISEASE PREVENTION: ICD-10-CM

## 2025-08-05 DIAGNOSIS — Z98.890 S/P THYROIDECTOMY: ICD-10-CM

## 2025-08-05 DIAGNOSIS — R52 BREAKTHROUGH PAIN: ICD-10-CM

## 2025-08-05 DIAGNOSIS — Z86.39 HISTORY OF GRAVES' DISEASE: ICD-10-CM

## 2025-08-05 PROCEDURE — 99999 PR PBB SHADOW E&M-EST. PATIENT-LVL V: CPT | Mod: PBBFAC,,, | Performed by: INTERNAL MEDICINE

## 2025-08-05 PROCEDURE — G2211 COMPLEX E/M VISIT ADD ON: HCPCS | Mod: ,,, | Performed by: INTERNAL MEDICINE

## 2025-08-05 PROCEDURE — 99215 OFFICE O/P EST HI 40 MIN: CPT | Mod: PBBFAC,PO,25 | Performed by: INTERNAL MEDICINE

## 2025-08-05 PROCEDURE — 99205 OFFICE O/P NEW HI 60 MIN: CPT | Mod: S$PBB,,, | Performed by: INTERNAL MEDICINE

## 2025-08-05 PROCEDURE — 73130 X-RAY EXAM OF HAND: CPT | Mod: TC,50,PO

## 2025-08-05 PROCEDURE — 73130 X-RAY EXAM OF HAND: CPT | Mod: 26,50,, | Performed by: RADIOLOGY

## 2025-08-05 RX ORDER — CETIRIZINE HYDROCHLORIDE 10 MG/1
10 TABLET ORAL
Qty: 30 TABLET | Refills: 1 | Status: SHIPPED | OUTPATIENT
Start: 2025-08-05

## 2025-08-05 RX ORDER — DULOXETIN HYDROCHLORIDE 20 MG/1
20 CAPSULE, DELAYED RELEASE ORAL DAILY
Qty: 30 CAPSULE | Refills: 3 | Status: SHIPPED | OUTPATIENT
Start: 2025-08-05 | End: 2025-09-04

## 2025-08-05 NOTE — PROGRESS NOTES
RHEUMATOLOGY OUTPATIENT CLINIC NOTE    8/5/2025    Attending Rheumatologist: Clifton Salazar  Primary Care Provider/Physician Requesting Consultation: Deshawn Wood MD   Chief Complaint/Reason For Consultation:  Polyarthralgia and Rheumatoid Arthritis      Subjective:     Cassie Srivastava is a 75 y.o. Other female with hyperthyroidism s/p thyroidectomy, hx HCV s/p rx, w/ chronic pain    Referred for re-evaluation for arthritis. Past rheumatic w/u negative (2018).  Main complaint of low back pain and hand arthralgias w/ mechanical features.  Associated w/ gelling phenomena.    Review of Systems   Constitutional:  Negative for fever.   Eyes:  Negative for pain.   Respiratory:  Negative for cough.    Cardiovascular:  Negative for chest pain.   Gastrointestinal:  Negative for melena.   Genitourinary:  Negative for dysuria, hematuria and urgency.   Musculoskeletal:  Positive for back pain and joint pain. Negative for falls.        Denies classic gout precipitants.   Skin:  Negative for rash.        No hx of PsO   Neurological:  Negative for headaches.       Chronic comorbid conditions affecting medical decision making today:  Past Medical History:   Diagnosis Date    A-fib     Acquired hypercoagulable state     Arthritis     COPD (chronic obstructive pulmonary disease)     Diastolic heart failure     Glaucoma     Graves disease     Heart failure, unspecified     Hypertension     Hypertensive heart disease with chronic diastolic congestive heart failure     Major depression in remission     MELITON on CPAP     Pulmonary hypertension     Strabismus      Past Surgical History:   Procedure Laterality Date    BUNIONECTOMY Bilateral     Bunion Correction    CATARACT EXTRACTION      CATARACT EXTRACTION  2016    HYSTERECTOMY      Particail    TOTAL KNEE REPLACEMENT USING COMPUTER NAVIGATION Right      Family History   Problem Relation Name Age of Onset    Glaucoma Mother      Glaucoma Sister      Cataracts Sister       Diabetes Sister      Hypertension Sister      Glaucoma Brother      Cataracts Brother      Hypertension Brother      Stroke Brother       Tobacco Use History[1]  Current Medications[2]     Objective:     Vitals:    08/05/25 1020   BP: 124/63   Pulse: (!) 52     Physical Exam   Eyes: Conjunctivae are normal.   Pulmonary/Chest: Effort normal. No respiratory distress.   Musculoskeletal:         General: Deformity present. No swelling or tenderness. Normal range of motion.   Neurological: She displays no weakness.   Skin: No rash noted.       Reviewed available old and all outside pertinent medical records available.    All lab results personally reviewed and interpreted by me.       ASSESSMENT / PLAN     1. Erosive osteoarthritis  Cyclic Citrullinated Peptide Antibody, IgG    Rheumatoid Factor    C-Reactive Protein    Uric Acid    X-Ray Hand Complete Bilateral    Ambulatory Referral/Consult to Physical Therapy    DULoxetine (CYMBALTA) 20 MG capsule      2. Arthritis  Ambulatory referral/consult to Rheumatology    X-Ray Hand Complete Bilateral      3. History of Graves' disease  S/P thyroidectomy       4. S/P thyroidectomy  Opn thyroid replacement.  Low TSH last measured.      5. History of type C viral hepatitis  S/p RX.      6. HyperCKemia  No reproducible weakness  Monitor once TSH WNR on 2 consecutive readings.  CK      7. Primary osteoarthritis involving multiple joints  Negative RA serologies previously.  No active synovitis on exam.  No marginal erosive changes reported on past XR survey.      8. Counseling on health promotion and disease prevention  Low purine and mediterranean diet.  Turmeric supp OTC.  Bleeding precautions (AC for Afib), do not recommend NSAIDs        9. Breakthrough pain  Acetaminophen OTC up to 2gm per day      10. Chronic low back pain, unspecified back pain laterality, unspecified whether sciatica present  Ambulatory Referral/Consult to Physical Therapy              Visit today included  increased complexity associated with the care of the episodic problem Erosive osteoarthritis addressed and managing the longitudinal care of the patient due to the serious and/or complex managed problem(s) History of Graves' disease, Counseling on health promotion and disease prevention , Medication monitoring encounter .    60 minutes of total time spent on the encounter, which includes face to face time and non-face to face time preparing to see the patient (eg, review of tests), Obtaining and/or reviewing separately obtained history, Documenting clinical information in the electronic or other health record, Independently interpreting results (not separately reported) and communicating results to the patient/family/caregiver, or Care coordination (not separately reported).     Clifton Salazar M.D.           [1]   Social History  Tobacco Use   Smoking Status Never    Passive exposure: Past   Smokeless Tobacco Never   Tobacco Comments    Father was a heavy smoker.    [2]   Current Outpatient Medications:     acetaminophen (TYLENOL) 325 MG tablet, Take 500 mg by mouth., Disp: , Rfl:     albuterol (VENTOLIN HFA) 90 mcg/actuation inhaler, Inhale 2 puffs into the lungs every 4 (four) hours as needed for Wheezing. Rescue, Disp: 1 g, Rfl: 3    albuterol-ipratropium (DUO-NEB) 2.5 mg-0.5 mg/3 mL nebulizer solution, Take 3 mLs by nebulization every 6 (six) hours as needed for Wheezing. Rescue, Disp: 1 each, Rfl: 6    ascorbic acid, vitamin C, 1,000 mg TbSR, Take 2,000 mg by mouth., Disp: , Rfl:     atorvastatin (LIPITOR) 10 MG tablet, Take 10 mg by mouth once daily., Disp: , Rfl:     benzonatate (TESSALON) 100 MG capsule, Take 200 mg by mouth., Disp: , Rfl:     bimatoprost (LUMIGAN) 0.01 % Drop, Place 1 drop into both eyes every evening., Disp: 7.5 mL, Rfl: 4    cetirizine (ZYRTEC) 10 MG tablet, Take 1 tablet (10 mg total) by mouth once daily., Disp: 30 tablet, Rfl: 1    cholecalciferol, vitamin D3, 125 mcg (5,000 unit) Tab,  Take by mouth once daily., Disp: , Rfl:     docusate sodium (COLACE) 100 MG capsule, Take 100 mg by mouth., Disp: , Rfl:     dofetilide (TIKOSYN) 500 MCG Cap, Take 1 capsule (500 mcg total) by mouth once daily., Disp: 90 capsule, Rfl: 3    dupilumab (DUPIXENT) 300 mg/2 mL Syrg, Inject 2 mLs (300 mg total) into the skin every 28 days., Disp: 2 mL, Rfl: 11    fluticasone (VERAMYST) 27.5 mcg/actuation nasal spray, 2 sprays by Nasal route once daily., Disp: , Rfl:     fluticasone furoate-vilanteroL (BREO ELLIPTA) 200-25 mcg/dose DsDv diskus inhaler, Inhale 1 puff into the lungs once daily. Controller, Disp: 1 each, Rfl: 5    fluticasone propionate (FLONASE) 50 mcg/actuation nasal spray, 2 sprays (100 mcg total) by Each Nostril route once daily., Disp: 16 g, Rfl: 5    furosemide (LASIX) 20 MG tablet, Take 1 tablet (20 mg total) by mouth 2 (two) times daily., Disp: 180 tablet, Rfl: 3    hydrALAZINE (APRESOLINE) 25 MG tablet, Take 1 tablet (25 mg total) by mouth 2 (two) times daily as needed (SBP > 160)., Disp: 60 tablet, Rfl: 11    hydroCHLOROthiazide 12.5 MG Tab, Take 1 tablet (12.5 mg total) by mouth once daily., Disp: 30 tablet, Rfl: 11    levalbuterol (XOPENEX HFA) 45 mcg/actuation inhaler, Inhale 1-2 puffs into the lungs every 8 (eight) hours as needed for Wheezing. Rescue, Disp: 15 g, Rfl: 2    levalbuterol (XOPENEX) 0.63 mg/3 mL nebulizer solution, Take 3 mLs (0.63 mg total) by nebulization 2 (two) times daily as needed for Wheezing., Disp: 1 each, Rfl: 6    levothyroxine (SYNTHROID) 100 MCG tablet, Take 1 tablet (100 mcg total) by mouth before breakfast., Disp: 30 tablet, Rfl: 11    lisinopriL (PRINIVIL,ZESTRIL) 20 MG tablet, Take 20 mg by mouth 2 (two) times a day., Disp: , Rfl:     magnesium oxide (MAG-OX) 400 mg (241.3 mg magnesium) tablet, Take 1 tablet by mouth every morning., Disp: , Rfl:     omeprazole (PRILOSEC) 10 MG capsule, Take 10 mg by mouth as needed., Disp: , Rfl:     potassium citrate (UROCIT-K) 10  mEq (1,080 mg) TbSR, Take 1 tablet (10 mEq total) by mouth daily as needed (when taking lasix)., Disp: 30 tablet, Rfl: 11    rivaroxaban (XARELTO) 20 mg Tab, Take 1 tablet (20 mg total) by mouth daily with dinner or evening meal., Disp: 90 tablet, Rfl: 3    zinc gluconate 50 mg tablet, Take 1 tablet by mouth every morning., Disp: , Rfl:

## 2025-08-18 ENCOUNTER — PATIENT MESSAGE (OUTPATIENT)
Dept: CARDIOLOGY | Facility: CLINIC | Age: 75
End: 2025-08-18
Payer: MEDICARE

## 2025-08-18 RX ORDER — LISINOPRIL 20 MG/1
20 TABLET ORAL 2 TIMES DAILY
Qty: 180 TABLET | Refills: 3 | Status: SHIPPED | OUTPATIENT
Start: 2025-08-18

## 2025-08-25 ENCOUNTER — OFFICE VISIT (OUTPATIENT)
Dept: PULMONOLOGY | Facility: CLINIC | Age: 75
End: 2025-08-25
Payer: MEDICARE

## 2025-08-25 VITALS
SYSTOLIC BLOOD PRESSURE: 168 MMHG | DIASTOLIC BLOOD PRESSURE: 70 MMHG | BODY MASS INDEX: 37.9 KG/M2 | HEIGHT: 62 IN | HEART RATE: 59 BPM | WEIGHT: 205.94 LBS | RESPIRATION RATE: 10 BRPM | OXYGEN SATURATION: 96 %

## 2025-08-25 DIAGNOSIS — J45.50 SEVERE PERSISTENT ASTHMA WITHOUT COMPLICATION: ICD-10-CM

## 2025-08-25 PROCEDURE — 99213 OFFICE O/P EST LOW 20 MIN: CPT | Mod: PBBFAC,PO | Performed by: STUDENT IN AN ORGANIZED HEALTH CARE EDUCATION/TRAINING PROGRAM

## 2025-08-25 PROCEDURE — 99999 PR PBB SHADOW E&M-EST. PATIENT-LVL III: CPT | Mod: PBBFAC,,, | Performed by: STUDENT IN AN ORGANIZED HEALTH CARE EDUCATION/TRAINING PROGRAM

## 2025-08-25 PROCEDURE — 99215 OFFICE O/P EST HI 40 MIN: CPT | Mod: S$PBB,,, | Performed by: STUDENT IN AN ORGANIZED HEALTH CARE EDUCATION/TRAINING PROGRAM

## 2025-08-25 RX ORDER — IPRATROPIUM BROMIDE AND ALBUTEROL SULFATE 2.5; .5 MG/3ML; MG/3ML
3 SOLUTION RESPIRATORY (INHALATION) EVERY 6 HOURS PRN
Qty: 1 EACH | Refills: 6 | Status: SHIPPED | OUTPATIENT
Start: 2025-08-25 | End: 2026-08-25

## 2025-08-25 RX ORDER — FLUTICASONE PROPIONATE 50 MCG
2 SPRAY, SUSPENSION (ML) NASAL DAILY
Qty: 16 G | Refills: 5 | Status: SHIPPED | OUTPATIENT
Start: 2025-08-25

## 2025-08-25 RX ORDER — BUDESONIDE AND FORMOTEROL FUMARATE DIHYDRATE 160; 4.5 UG/1; UG/1
2 AEROSOL RESPIRATORY (INHALATION) EVERY 12 HOURS
Qty: 1 G | Refills: 3 | Status: SHIPPED | OUTPATIENT
Start: 2025-08-25 | End: 2026-08-25

## 2025-08-25 RX ORDER — MONTELUKAST SODIUM 10 MG/1
10 TABLET ORAL NIGHTLY
COMMUNITY
Start: 2025-06-10 | End: 2025-08-25

## 2025-08-25 RX ORDER — ALBUTEROL SULFATE 90 UG/1
2 INHALANT RESPIRATORY (INHALATION) EVERY 4 HOURS PRN
Qty: 1 G | Refills: 3 | Status: SHIPPED | OUTPATIENT
Start: 2025-08-25

## 2025-08-25 RX ORDER — FLUTICASONE FUROATE AND VILANTEROL 200; 25 UG/1; UG/1
1 POWDER RESPIRATORY (INHALATION) DAILY
Qty: 1 EACH | Refills: 5 | Status: SHIPPED | OUTPATIENT
Start: 2025-08-25

## 2025-08-29 ENCOUNTER — PATIENT MESSAGE (OUTPATIENT)
Dept: ADMINISTRATIVE | Facility: HOSPITAL | Age: 75
End: 2025-08-29
Payer: MEDICARE

## 2025-09-03 ENCOUNTER — OFFICE VISIT (OUTPATIENT)
Dept: OPHTHALMOLOGY | Facility: CLINIC | Age: 75
End: 2025-09-03
Payer: MEDICARE

## 2025-09-03 DIAGNOSIS — H43.813 POSTERIOR VITREOUS DETACHMENT OF BOTH EYES: ICD-10-CM

## 2025-09-03 DIAGNOSIS — E05.00 GRAVES DISEASE: Primary | ICD-10-CM

## 2025-09-03 DIAGNOSIS — Z96.1 PSEUDOPHAKIA OF BOTH EYES: ICD-10-CM

## 2025-09-03 DIAGNOSIS — H50.112 EXOTROPIA OF LEFT EYE: ICD-10-CM

## 2025-09-03 DIAGNOSIS — H50.22 HYPOTROPIA OF LEFT EYE: ICD-10-CM

## 2025-09-03 DIAGNOSIS — H40.9 GLAUCOMA OF BOTH EYES, UNSPECIFIED GLAUCOMA TYPE: ICD-10-CM

## 2025-09-03 PROCEDURE — 99212 OFFICE O/P EST SF 10 MIN: CPT | Mod: PBBFAC,PO | Performed by: OPTOMETRIST

## 2025-09-03 PROCEDURE — 99999 PR PBB SHADOW E&M-EST. PATIENT-LVL II: CPT | Mod: PBBFAC,,, | Performed by: OPTOMETRIST

## 2025-09-03 PROCEDURE — 99214 OFFICE O/P EST MOD 30 MIN: CPT | Mod: S$PBB,,, | Performed by: OPTOMETRIST
